# Patient Record
Sex: MALE | Race: WHITE | Employment: OTHER | URBAN - METROPOLITAN AREA
[De-identification: names, ages, dates, MRNs, and addresses within clinical notes are randomized per-mention and may not be internally consistent; named-entity substitution may affect disease eponyms.]

---

## 2017-03-07 ENCOUNTER — TELEPHONE (OUTPATIENT)
Dept: INTERNAL MEDICINE | Facility: CLINIC | Age: 72
End: 2017-03-07

## 2017-03-07 NOTE — TELEPHONE ENCOUNTER
PA request from Malgorzata in  for Jojo Morton Pen 5x3  Malgorzata: [phone]    Plan: [phone]     i.d: [id number]    Copy of PA in folder    Pao might know what else is covered, since we've had these requests before

## 2017-03-08 NOTE — TELEPHONE ENCOUNTER
Basaglar is covered alternative for Lantus. Sent to Angeles to review if okay to change prescription.

## 2017-03-09 NOTE — TELEPHONE ENCOUNTER
[facility] Lantus [facility] or Basaglar.    Gave V.O. Ok to switch to Basaglar, same dose.

## 2017-03-13 ENCOUNTER — OFFICE VISIT (OUTPATIENT)
Dept: INTERNAL MEDICINE | Facility: CLINIC | Age: 72
End: 2017-03-13
Payer: COMMERCIAL

## 2017-03-13 VITALS
WEIGHT: 199.1 LBS | HEIGHT: 72 IN | TEMPERATURE: 98.1 F | SYSTOLIC BLOOD PRESSURE: 128 MMHG | HEART RATE: 79 BPM | DIASTOLIC BLOOD PRESSURE: 60 MMHG | RESPIRATION RATE: 16 BRPM | OXYGEN SATURATION: 97 % | BODY MASS INDEX: 26.97 KG/M2

## 2017-03-13 DIAGNOSIS — E03.8 OTHER SPECIFIED HYPOTHYROIDISM: ICD-10-CM

## 2017-03-13 DIAGNOSIS — I10 ESSENTIAL HYPERTENSION: Primary | ICD-10-CM

## 2017-03-13 DIAGNOSIS — E08.42 DIABETIC POLYNEUROPATHY ASSOCIATED WITH DIABETES MELLITUS DUE TO UNDERLYING CONDITION (H): ICD-10-CM

## 2017-03-13 LAB
ALT SERPL W P-5'-P-CCNC: 16 U/L (ref 0–70)
ANION GAP SERPL CALCULATED.3IONS-SCNC: 9 MMOL/L (ref 3–14)
BUN SERPL-MCNC: 41 MG/DL (ref 7–30)
CALCIUM SERPL-MCNC: 8 MG/DL (ref 8.5–10.1)
CHLORIDE SERPL-SCNC: 112 MMOL/L (ref 94–109)
CHOLEST SERPL-MCNC: 148 MG/DL
CO2 SERPL-SCNC: 22 MMOL/L (ref 20–32)
CREAT SERPL-MCNC: 2.57 MG/DL (ref 0.66–1.25)
ERYTHROCYTE [DISTWIDTH] IN BLOOD BY AUTOMATED COUNT: 13.4 % (ref 10–15)
GFR SERPL CREATININE-BSD FRML MDRD: 25 ML/MIN/1.7M2
GLUCOSE SERPL-MCNC: 110 MG/DL (ref 70–99)
HBA1C MFR BLD: 7.2 % (ref 4.3–6)
HCT VFR BLD AUTO: 34.3 % (ref 40–53)
HDLC SERPL-MCNC: 42 MG/DL
HGB BLD-MCNC: 10.9 G/DL (ref 13.3–17.7)
LDLC SERPL CALC-MCNC: 80 MG/DL
MCH RBC QN AUTO: 28.5 PG (ref 26.5–33)
MCHC RBC AUTO-ENTMCNC: 31.8 G/DL (ref 31.5–36.5)
MCV RBC AUTO: 90 FL (ref 78–100)
NONHDLC SERPL-MCNC: 106 MG/DL
PLATELET # BLD AUTO: 252 10E9/L (ref 150–450)
POTASSIUM SERPL-SCNC: 4.8 MMOL/L (ref 3.4–5.3)
RBC # BLD AUTO: 3.83 10E12/L (ref 4.4–5.9)
SODIUM SERPL-SCNC: 143 MMOL/L (ref 133–144)
TRIGL SERPL-MCNC: 130 MG/DL
TSH SERPL DL<=0.05 MIU/L-ACNC: 2.9 MU/L (ref 0.4–4)
WBC # BLD AUTO: 9 10E9/L (ref 4–11)

## 2017-03-13 PROCEDURE — 36415 COLL VENOUS BLD VENIPUNCTURE: CPT | Performed by: NURSE PRACTITIONER

## 2017-03-13 PROCEDURE — 84460 ALANINE AMINO (ALT) (SGPT): CPT | Performed by: NURSE PRACTITIONER

## 2017-03-13 PROCEDURE — 84443 ASSAY THYROID STIM HORMONE: CPT | Mod: GZ | Performed by: NURSE PRACTITIONER

## 2017-03-13 PROCEDURE — 83036 HEMOGLOBIN GLYCOSYLATED A1C: CPT | Mod: GZ | Performed by: NURSE PRACTITIONER

## 2017-03-13 PROCEDURE — 99214 OFFICE O/P EST MOD 30 MIN: CPT | Performed by: NURSE PRACTITIONER

## 2017-03-13 PROCEDURE — 80048 BASIC METABOLIC PNL TOTAL CA: CPT | Performed by: NURSE PRACTITIONER

## 2017-03-13 PROCEDURE — 82043 UR ALBUMIN QUANTITATIVE: CPT | Mod: GZ | Performed by: NURSE PRACTITIONER

## 2017-03-13 PROCEDURE — 85027 COMPLETE CBC AUTOMATED: CPT | Performed by: NURSE PRACTITIONER

## 2017-03-13 PROCEDURE — 80061 LIPID PANEL: CPT | Performed by: NURSE PRACTITIONER

## 2017-03-13 NOTE — NURSING NOTE
Chief Complaint   Patient presents with     Follow Up For     diabetes       Initial /74 (BP Location: Right arm, Patient Position: Chair, Cuff Size: Adult Regular)  Pulse 79  Temp 98.1  F (36.7  C) (Oral)  Resp 16  Ht 6' (1.829 m)  Wt 199 lb 1.6 oz (90.3 kg)  SpO2 97%  BMI 27 kg/m2 Estimated body mass index is 27 kg/(m^2) as calculated from the following:    Height as of this encounter: 6' (1.829 m).    Weight as of this encounter: 199 lb 1.6 oz (90.3 kg).  Medication Reconciliation: complete

## 2017-03-13 NOTE — MR AVS SNAPSHOT
"              After Visit Summary   3/13/2017    Brady Oviedo    MRN: [id number]           Patient Information     Date Of Birth          1945        Visit Information        Provider Department      3/13/2017 9:00 AM Angeles Friedman NP [facility]        Today's Diagnoses     Essential hypertension    -  1    Diabetic polyneuropathy associated with diabetes mellitus due to underlying condition (H)        Other specified hypothyroidism          Care Instructions    6 month follow up  Angeles Friedman CNP          Follow-ups after your visit        Who to contact     If you have questions or need follow up information about today's clinic visit or your schedule please contact [facility] directly at [phone].  Normal or non-critical lab and imaging results will be communicated to you by MyChart, letter or phone within 4 business days after the clinic has received the results. If you do not hear from us within 7 days, please contact the clinic through MyChart or phone. If you have a critical or abnormal lab result, we will notify you by phone as soon as possible.  Submit refill requests through [employer] or call your pharmacy and they will forward the refill request to us. Please allow 3 business days for your refill to be completed.          Additional Information About Your Visit        MyChart Information     [employer] lets you send messages to your doctor, view your test results, renew your prescriptions, schedule appointments and more. To sign up, go to [email or web address] . Click on \"Log in\" on the left side of the screen, which will take you to the Welcome page. Then click on \"Sign up Now\" on the right side of the page.     You will be asked to enter the access code listed below, as well as some personal information. Please follow the directions to create your username and password.     Your access code is: [email or web address]  Expires: 6/11/2017  9:22 AM     Your " access code will  in 90 days. If you need help or a new code, please call your [facility] or [phone].        Care EveryWhere ID     This is your Care EveryWhere ID. This could be used by other organizations to access your [place] medical records  [id number]        Your Vitals Were     Pulse Temperature Respirations Height Pulse Oximetry BMI (Body Mass Index)    79 98.1  F (36.7  C) (Oral) 16 6' (1.829 m) 97% 27 kg/m2       Blood Pressure from Last 3 Encounters:   17 164/74   16 152/72   11/10/15 138/76    Weight from Last 3 Encounters:   17 199 lb 1.6 oz (90.3 kg)   16 195 lb 4.8 oz (88.6 kg)   11/10/15 200 lb 3.2 oz (90.8 kg)              We Performed the Following     Albumin Random Urine Quantitative     ALT     Basic metabolic panel     CBC with platelets     Hemoglobin A1c     Lipid Profile     TSH        Primary Care Provider Office Phone # Fax #    Angeles Friedman [phone]       [facility] [address] [facility] [facility] [address]        Thank you!     Thank you for choosing [facility]  for your care. Our goal is always to provide you with excellent care. Hearing back from our patients is one way we can continue to improve our services. Please take a few minutes to complete the written survey that you may receive in the mail after your visit with us. Thank you!             Your Updated Medication List - Protect others around you: Learn how to safely use, store and throw away your medicines at www.disposemymeds.org.          This list is accurate as of: 3/13/17  9:22 AM.  Always use your most recent med list.                   Brand Name Dispense Instructions for use    amLODIPine 10 MG tablet    NORVASC    90 tablet    Take 1 tablet (10 mg) by mouth daily 1 day at pm       aspirin 81 MG chewable tablet     90 tablet    Take 1 tablet (81 mg) by mouth daily       blood glucose calibration solution     1 each    Use  to calibrate blood glucose monitor as directed.       blood glucose monitoring lancets     3 Box    Use to test blood sugar 3-4 times daily or as directed.       blood glucose monitoring meter device kit     1 kit    Use to test blood sugars 3 times daily or as directed.       blood glucose monitoring test strip    ACCU-CHEK COMPACT DRUM    100 each    Use to test blood sugars 3 times daily or as directed.       chlorthalidone 25 MG tablet    HYGROTON    90 tablet    Take 1 tablet (25 mg) by mouth daily       cloNIDine 0.1 MG tablet    CATAPRES    90 tablet    Take 1 tablet (0.1 mg) by mouth daily       ferrous sulfate 325 (65 FE) MG tablet    IRON    30 tablet    Take 1 tablet (325 mg) by mouth daily (with breakfast)       * insulin pen needle 31G X 8 MM    B-D U/F    100 each    Use 1 pen needles daily or as directed.  No more refills after this.  Due for appt in April 2016.       * insulin pen needle 31G X 6 MM     100 each    Daily insulin injections       LANTUS SOLOSTAR 100 UNIT/ML injection   Generic drug:  insulin glargine     3 Month    15 units at bedtime       levothyroxine 50 MCG tablet    SYNTHROID/LEVOTHROID    90 tablet    Take 1 tablet (50 mcg) by mouth daily       metoprolol 100 MG 24 hr tablet    TOPROL-XL    90 tablet    Take 1 tablet (100 mg) by mouth daily       simvastatin 10 MG tablet    ZOCOR    90 tablet    Take 1 tablet (10 mg) by mouth At Bedtime Take 1 tablet by mouth At Bedtime.       * Notice:  This list has 2 medication(s) that are the same as other medications prescribed for you. Read the directions carefully, and ask your doctor or other care provider to review them with you.

## 2017-03-13 NOTE — PROGRESS NOTES
SUBJECTIVE:                                                    Brady Oviedo is a 71 year old male who presents to clinic today for the following health issues:      Diabetes Follow-up    Patient is checking blood sugars: once daily.  Results are as follows:         am -     Diabetic concerns: None     Symptoms of hypoglycemia (low blood sugar): none     Paresthesias (numbness or burning in feet) or sores: Yes numb     Date of last diabetic eye exam: 1 yr ago       Amount of exercise or physical activity: 2-3 days/week for an average of 15-30 minutes    Problems taking medications regularly: No    Medication side effects: none  Diet: diabetic    Hyperlipidemia Follow-Up      Rate your low fat/cholesterol diet?: good    Taking statin?  Yes, no muscle aches from statin    Other lipid medications/supplements?:  none     Hypertension Follow-up      Outpatient blood pressures are not being checked.    Low Salt Diet: no added salt     Hypothyroidism Follow-up      Since last visit, patient describes the following symptoms: Weight stable, no hair loss, no skin changes, no constipation, no loose stools       Problem list and histories reviewed & adjusted, as indicated.  Additional history: as documented    Patient Active Problem List   Diagnosis     Type II diabetes mellitus with peripheral circulatory disorder (HCC)     HTN (hypertension)     Diabetic polyneuropathy (HCC)     Routine health maintenance     Diabetic foot ulcer (H)     Hyperlipidemia with target LDL less than 100     Hypothyroidism     Sick sinus syndrome (H)     Health Care Home     Atrial fibrillation (H)     Complete AV block (H)     Chronic kidney disease, stage III (moderate)     Amputated toe, unspecified laterality (H)     Past Surgical History   Procedure Laterality Date     Amputate toe(s)  11/24/2013     Procedure: AMPUTATE TOE(S);  2ND RAY AMPUTATION OF LT FOOT ;  Surgeon: Clint Alvarez DPM;  Location: [facility] OR     Incision and  drainage bone lower extremity, combined  11/26/2013     Procedure: COMBINED INCISION AND DRAINAGE BONE LOWER EXTREMITY;  REVISIONAL LEFT FOOT INCISION AND DRAINAGE WITH BONE DEBRIDEMENT AND FLAP CLOSURE ;  Surgeon: Evonne Allison DPM, Pod;  Location: [facility] OR     Internal pacemaker  3/25/14       Social History   Substance Use Topics     Smoking status: Never Smoker     Smokeless tobacco: Never Used     Alcohol use Yes      Comment: rare     Family History   Problem Relation Age of Onset     Cancer - colorectal Mother      in remission prior to her death. Was killed by being hit by a car at 82     DIABETES Brother          Current Outpatient Prescriptions   Medication Sig Dispense Refill     simvastatin (ZOCOR) 10 MG tablet Take 1 tablet (10 mg) by mouth At Bedtime Take 1 tablet by mouth At Bedtime. 90 tablet 1     insulin pen needle (B-D U/F) 31G X 8 MM Use 1 pen needles daily or as directed.  No more refills after this.  Due for appt in April 2016. 100 each 0     metoprolol (TOPROL-XL) 100 MG 24 hr tablet Take 1 tablet (100 mg) by mouth daily 90 tablet 3     levothyroxine (SYNTHROID, LEVOTHROID) 50 MCG tablet Take 1 tablet (50 mcg) by mouth daily 90 tablet 3     blood glucose monitoring (ACCU-CHEK COMPACT DRUM) test strip Use to test blood sugars 3 times daily or as directed. 100 each 6     chlorthalidone (HYGROTON) 25 MG tablet Take 1 tablet (25 mg) by mouth daily 90 tablet 3     cloNIDine (CATAPRES) 0.1 MG tablet Take 1 tablet (0.1 mg) by mouth daily 90 tablet 3     amLODIPine (NORVASC) 10 MG tablet Take 1 tablet (10 mg) by mouth daily 1 day at pm 90 tablet 3     LANTUS SOLOSTAR 100 UNIT/ML soln 15 units at bedtime 3 Month 3     blood glucose monitoring (ACCU-CHEK MULTICLIX) lancets Use to test blood sugar 3-4 times daily or as directed. 3 Box 3     insulin pen needle 31G X 6 MM Daily insulin injections 100 each 0     ferrous sulfate (IRON) 325 (65 FE) MG tablet Take 1 tablet (325 mg) by mouth daily (with  breakfast) 30 tablet 2     Blood Glucose Monitoring Suppl (ACCU-CHEK COMPACT CARE KIT) KIT Use to test blood sugars 3 times daily or as directed. 1 kit 0     Blood Glucose Calibration (ACCU-CHEK COMPACT BLUE CONTROL) LIQD Use to calibrate blood glucose monitor as directed. 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 1 Device 0     Recent Labs   Lab Test  06/28/16   0816  10/08/15   0828  07/14/15   0854   06/11/15   0820   09/10/14   0832   A1C  8.8*  6.9*   --    --   6.2*   < >  6.6*   LDL  63  74  86   --    --    --   56   HDL  39*  43  32*   --    --    --   51   TRIG  126  74  117   --    --    --   114   ALT  18   --   16   --    --    --   12   CR  2.02*  1.86*  1.76*   < >  1.85*   < >  1.54*   GFRESTIMATED  33*  36*  38*   < >  36*   < >  45*   GFRESTBLACK  39*  44*  46*   < >  44*   < >  54*   POTASSIUM  4.3  4.6  4.8   < >  6.2*   < >  4.7   TSH  3.79  3.68   --    --    --    --   2.97    < > = values in this interval not displayed.      BP Readings from Last 3 Encounters:   03/13/17 128/60   06/28/16 152/72   11/10/15 138/76    Wt Readings from Last 3 Encounters:   03/13/17 199 lb 1.6 oz (90.3 kg)   06/28/16 195 lb 4.8 oz (88.6 kg)   11/10/15 200 lb 3.2 oz (90.8 kg)                    Reviewed and updated as needed this visit by clinical staff  Tobacco  Allergies  Meds  Med Hx  Surg Hx  Fam Hx  Soc Hx      Reviewed and updated as needed this visit by Provider         ROS:  C: NEGATIVE for fever, chills, change in weight  E/M: NEGATIVE for ear, mouth and throat problems  R: NEGATIVE for significant cough or SOB  CV: NEGATIVE for chest pain, palpitations or peripheral edema  GI: NEGATIVE for nausea, abdominal pain, heartburn, or change in bowel habits    OBJECTIVE:                                                     /60 (BP Location: Right arm, Patient Position: Chair, Cuff Size: Adult Large)  Pulse 79  Temp 98.1  F (36.7  C) (Oral)  Resp 16  Ht 6' (1.829 m)  Wt 199 lb 1.6 oz (90.3 kg)  SpO2 97%  BMI 27 kg/m2  Body mass index is 27 kg/(m^2).  GENERAL: healthy, alert and no distress  NECK: no adenopathy, no asymmetry, masses, or scars and thyroid normal to palpation  RESP: lungs clear to auscultation - no rales, rhonchi or wheezes  CV: regular rate and rhythm, normal S1 S2, no S3 or S4, no murmur, click or rub, no peripheral edema and peripheral pulses strong  Diabetic foot exam: normal DP and PT pulses, no trophic changes or ulcerative lesions and normal sensory exam         ASSESSMENT/PLAN:                                                              ICD-10-CM    1. Essential hypertension I10 Basic metabolic panel   2. Diabetic polyneuropathy associated with diabetes mellitus due to underlying condition (H) E08.42 CBC with platelets     Lipid Profile     Hemoglobin A1c     Albumin Random Urine Quantitative   3. Other specified hypothyroidism E03.8 ALT     TSH       Patient Instructions   6 month follow up  Angeles Friedman, NP  [facility]  3

## 2017-03-14 ENCOUNTER — TELEPHONE (OUTPATIENT)
Dept: INTERNAL MEDICINE | Facility: CLINIC | Age: 72
End: 2017-03-14

## 2017-03-14 DIAGNOSIS — N18.30 CHRONIC KIDNEY DISEASE, STAGE III (MODERATE) (H): Primary | ICD-10-CM

## 2017-03-14 LAB
CREAT UR-MCNC: 75 MG/DL
MICROALBUMIN UR-MCNC: 3310 MG/L
MICROALBUMIN/CREAT UR: 4437 MG/G CR (ref 0–17)

## 2017-03-14 NOTE — TELEPHONE ENCOUNTER
Please advise pt A1C improved to 7.2, HGB low at 10.9, advise daily ferrous sulfate 325 mg with stool softener as needed.  Kidney function is slightly worse would recommend seeing a nephrologist, referral done.  Angeles Friedman CNP

## 2017-03-14 NOTE — LETTER
[facility]  [address], [address]  [place]  [address]        March 14, 2017    Brady BRITTNY Oviedo    [address] [facility] [id number]              Dear Brady,    Your A1C improved to 7.2.     Your hemoglobin is low at 10.9, please begin taking ferrous sulfate 325 mg daily with a stool softener as needed (ferrous sulfate can cause constipation). Both the ferrous sulfate and a stool softener can be purchased over-the-counter.    Kidney function is slightly worse, I would recommend seeing a nephrologist. Referral done and a copy is enclosed for you. Please contact them to make an appointment.     If you have any questions or concerns, please contact our office at [phone].    Sincerely,      Angeles Friedman C.N.P.

## 2017-03-14 NOTE — TELEPHONE ENCOUNTER
Per demographics, pt requests all results be mailed to him, he does not want to be called with them.     Results mailed to pt with copy of referral.

## 2017-05-08 DIAGNOSIS — E11.51 TYPE II DIABETES MELLITUS WITH PERIPHERAL CIRCULATORY DISORDER (H): Primary | ICD-10-CM

## 2017-05-08 RX ORDER — INSULIN GLARGINE 100 [IU]/ML
25 INJECTION, SOLUTION SUBCUTANEOUS AT BEDTIME
Qty: 30 ML | Refills: 3 | Status: SHIPPED | OUTPATIENT
Start: 2017-05-08 | End: 2018-06-23

## 2017-05-08 NOTE — TELEPHONE ENCOUNTER
Pt calling.  He is almost out of Basaglar insulin.  States PCP recently increased his dose from 15 units QHS to 25 units QHS.    Please send new rx to pharm.    Last OV 3-13-17    Lab Results   Component Value Date    A1C 7.2 03/13/2017    A1C 8.8 06/28/2016    A1C 6.9 10/08/2015    A1C 6.2 06/11/2015    A1C 6.7 03/11/2015       Please advise, thanks.    (Rx pended.)    (See TE 3-7-17--per insur, pt had to switch from Lantus to Basaglar.)

## 2017-06-13 ENCOUNTER — OFFICE VISIT (OUTPATIENT)
Dept: CARDIOLOGY | Facility: CLINIC | Age: 72
End: 2017-06-13
Payer: COMMERCIAL

## 2017-06-13 ENCOUNTER — ALLIED HEALTH/NURSE VISIT (OUTPATIENT)
Dept: CARDIOLOGY | Facility: CLINIC | Age: 72
End: 2017-06-13
Payer: COMMERCIAL

## 2017-06-13 VITALS
BODY MASS INDEX: 25.87 KG/M2 | HEART RATE: 72 BPM | HEIGHT: 72 IN | SYSTOLIC BLOOD PRESSURE: 140 MMHG | WEIGHT: 191 LBS | DIASTOLIC BLOOD PRESSURE: 80 MMHG

## 2017-06-13 DIAGNOSIS — Z95.0 CARDIAC PACEMAKER IN SITU: Primary | ICD-10-CM

## 2017-06-13 DIAGNOSIS — I10 ESSENTIAL HYPERTENSION: Primary | ICD-10-CM

## 2017-06-13 DIAGNOSIS — I48.0 PAROXYSMAL ATRIAL FIBRILLATION (H): ICD-10-CM

## 2017-06-13 DIAGNOSIS — I44.1 AV BLOCK, MOBITZ 2: ICD-10-CM

## 2017-06-13 PROCEDURE — 93280 PM DEVICE PROGR EVAL DUAL: CPT | Performed by: INTERNAL MEDICINE

## 2017-06-13 PROCEDURE — 93000 ELECTROCARDIOGRAM COMPLETE: CPT | Performed by: INTERNAL MEDICINE

## 2017-06-13 PROCEDURE — 99214 OFFICE O/P EST MOD 30 MIN: CPT | Mod: 25 | Performed by: INTERNAL MEDICINE

## 2017-06-13 NOTE — MR AVS SNAPSHOT
After Visit Summary   6/13/2017    Brady Oviedo    MRN: [id number]           Patient Information     Date Of Birth          1945        Visit Information        Provider Department      6/13/2017 10:30 AM [place] DCRN [facility]        Today's Diagnoses     Cardiac pacemaker in situ    -  1       Follow-ups after your visit        Your next 10 appointments already scheduled     Sep 19, 2017  2:15 PM CDT   Remote PPM Check with [place] TECH1   [facility] ([facility])    [address] [place], [place] [address]  [facility] [id number] [phone]           This appointment is for a remote check of your pacemaker.  This is not an appointment at the office.              Future tests that were ordered for you today     Open Future Orders        Priority Expected Expires Ordered    Follow-Up with Cardiac Advanced Practice Provider Routine 6/13/2019 10/26/2019 6/13/2017            Who to contact     If you have questions or need follow up information about today's clinic visit or your schedule please contact [facility] directly at [phone].  Normal or non-critical lab and imaging results will be communicated to you by [employer], letter or phone within 4 business days after the clinic has received the results. If you do not hear from us within 7 days, please contact the clinic through [employer] or phone. If you have a critical or abnormal lab result, we will notify you by phone as soon as possible.  Submit refill requests through [employer] or call your pharmacy and they will forward the refill request to us. Please allow 3 business days for your refill to be completed.          Additional Information About Your Visit        MyChart Information     [employer] lets you send messages to your doctor, view your test results, renew your prescriptions, schedule appointments and more. To sign up, go  "to [email or web address] . Click on \"Log in\" on the left side of the screen, which will take you to the Welcome page. Then click on \"Sign up Now\" on the right side of the page.     You will be asked to enter the access code listed below, as well as some personal information. Please follow the directions to create your username and password.     Your access code is: [email or web address]  Expires: 2017 10:31 AM     Your access code will  in 90 days. If you need help or a new code, please call your [place] clinic or [phone].        Care EveryWhere ID     This is your Care EveryWhere ID. This could be used by other organizations to access your [place] medical records  [id number]         Blood Pressure from Last 3 Encounters:   17 140/80   17 128/60   16 152/72    Weight from Last 3 Encounters:   17 86.6 kg (191 lb)   17 90.3 kg (199 lb 1.6 oz)   16 88.6 kg (195 lb 4.8 oz)              We Performed the Following     PM DEVICE PROGRAMMING EVAL, DUAL LEAD PACER ([id number])        Primary Care Provider Office Phone # Fax #    Angeles Friedman [phone]       [facility] [address] [place] [address]        Thank you!     Thank you for choosing [facility]  for your care. Our goal is always to provide you with excellent care. Hearing back from our patients is one way we can continue to improve our services. Please take a few minutes to complete the written survey that you may receive in the mail after your visit with us. Thank you!             Your Updated Medication List - Protect others around you: Learn how to safely use, store and throw away your medicines at www.disposemymeds.org.          This list is accurate as of: 17 11:13 AM.  Always use your most recent med list.                   Brand Name Dispense Instructions for use    amLODIPine 10 MG tablet    NORVASC    90 tablet    Take " 1 tablet (10 mg) by mouth daily 1 day at pm       aspirin 81 MG chewable tablet     90 tablet    Take 1 tablet (81 mg) by mouth daily       blood glucose calibration solution     1 each    Use to calibrate blood glucose monitor as directed.       blood glucose monitoring lancets     3 Box    Use to test blood sugar 3-4 times daily or as directed.       blood glucose monitoring meter device kit     1 kit    Use to test blood sugars 3 times daily or as directed.       blood glucose monitoring test strip    ACCU-CHEK COMPACT DRUM    100 each    Use to test blood sugars 3 times daily or as directed.       chlorthalidone 25 MG tablet    HYGROTON    90 tablet    Take 1 tablet (25 mg) by mouth daily       cloNIDine 0.1 MG tablet    CATAPRES    90 tablet    Take 1 tablet (0.1 mg) by mouth daily       ferrous sulfate 325 (65 FE) MG tablet    IRON    30 tablet    Take 1 tablet (325 mg) by mouth daily (with breakfast)       * insulin pen needle 31G X 8 MM    B-D U/F    100 each    Use 1 pen needles daily or as directed.  No more refills after this.  Due for appt in April 2016.       * insulin pen needle 31G X 6 MM     100 each    Daily insulin injections       * LANTUS SOLOSTAR 100 UNIT/ML injection   Generic drug:  insulin glargine     3 Month    15 units at bedtime       * insulin glargine 100 UNIT/ML injection     30 mL    Inject 25 Units Subcutaneous At Bedtime       levothyroxine 50 MCG tablet    SYNTHROID/LEVOTHROID    90 tablet    Take 1 tablet (50 mcg) by mouth daily       metoprolol 100 MG 24 hr tablet    TOPROL-XL    90 tablet    Take 1 tablet (100 mg) by mouth daily       simvastatin 10 MG tablet    ZOCOR    90 tablet    Take 1 tablet (10 mg) by mouth At Bedtime Take 1 tablet by mouth At Bedtime.       * Notice:  This list has 4 medication(s) that are the same as other medications prescribed for you. Read the directions carefully, and ask your doctor or other care provider to review them with you.

## 2017-06-13 NOTE — PROGRESS NOTES
HPI and Plan:   See dictation    Orders Placed This Encounter   Procedures     Follow-Up with Cardiac Advanced Practice Provider     EKG 12-lead complete w/read - Clinics (performed today)       No orders of the defined types were placed in this encounter.      There are no discontinued medications.      Encounter Diagnoses   Name Primary?     Essential hypertension Yes     Paroxysmal atrial fibrillation (H)        CURRENT MEDICATIONS:  Current Outpatient Prescriptions   Medication Sig Dispense Refill     insulin glargine (BASAGLAR KWIKPEN) 100 UNIT/ML injection Inject 25 Units Subcutaneous At Bedtime 30 mL 3     simvastatin (ZOCOR) 10 MG tablet Take 1 tablet (10 mg) by mouth At Bedtime Take 1 tablet by mouth At Bedtime. 90 tablet 1     insulin pen needle (B-D U/F) 31G X 8 MM Use 1 pen needles daily or as directed.  No more refills after this.  Due for appt in April 2016. 100 each 0     metoprolol (TOPROL-XL) 100 MG 24 hr tablet Take 1 tablet (100 mg) by mouth daily 90 tablet 3     levothyroxine (SYNTHROID, LEVOTHROID) 50 MCG tablet Take 1 tablet (50 mcg) by mouth daily 90 tablet 3     blood glucose monitoring (ACCU-CHEK COMPACT DRUM) test strip Use to test blood sugars 3 times daily or as directed. 100 each 6     chlorthalidone (HYGROTON) 25 MG tablet Take 1 tablet (25 mg) by mouth daily 90 tablet 3     cloNIDine (CATAPRES) 0.1 MG tablet Take 1 tablet (0.1 mg) by mouth daily 90 tablet 3     amLODIPine (NORVASC) 10 MG tablet Take 1 tablet (10 mg) by mouth daily 1 day at pm 90 tablet 3     LANTUS SOLOSTAR 100 UNIT/ML soln 15 units at bedtime 3 Month 3     blood glucose monitoring (ACCU-CHEK MULTICLIX) lancets Use to test blood sugar 3-4 times daily or as directed. 3 Box 3     insulin pen needle 31G X 6 MM Daily insulin injections 100 each 0     ferrous sulfate (IRON) 325 (65 FE) MG tablet Take 1 tablet (325 mg) by mouth daily (with breakfast) 30 tablet 2     Blood Glucose Monitoring Suppl (ACCU-CHEK COMPACT CARE KIT)  KIT Use to test blood sugars 3 times daily or as directed. 1 kit 0     Blood Glucose Calibration (ACCU-CHEK COMPACT BLUE CONTROL) LIQD Use to calibrate blood glucose monitor as directed. 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)      Diabetic neuropathy (H)      HTN (hypertension)      Hyperlipidemia LDL goal < 130        PAST SURGICAL HISTORY:  Past Surgical History:   Procedure Laterality Date     AMPUTATE TOE(S)  11/24/2013    Procedure: AMPUTATE TOE(S);  2ND RAY AMPUTATION OF LT FOOT ;  Surgeon: Clint Alvarez DPM;  Location: [facility] OR     INCISION AND DRAINAGE BONE LOWER EXTREMITY, COMBINED  11/26/2013    Procedure: COMBINED INCISION AND DRAINAGE BONE LOWER EXTREMITY;  REVISIONAL LEFT FOOT INCISION AND DRAINAGE WITH BONE DEBRIDEMENT AND FLAP CLOSURE ;  Surgeon: Evonne Allison DPM, Pod;  Location: [facility] OR     internal pacemaker  3/25/14       FAMILY HISTORY:  Family History   Problem Relation Age of Onset     Cancer - colorectal Mother      in remission prior to her death. Was killed by being hit by a car at 82     Myocardial Infarction Father      DIABETES Brother      Unknown/Adopted Maternal Grandmother      Unknown/Adopted Maternal Grandfather      Unknown/Adopted Paternal Grandmother      Unknown/Adopted Paternal Grandfather      Family History Negative Sister      Family History Negative Son      Family History Negative Daughter      Family History Negative Brother      Family History Negative Brother        SOCIAL HISTORY:  Social History     Social History     Marital status:       Spouse name: N/A     Number of children: N/A     Years of education: N/A     Social History Main Topics     Smoking status: Never Smoker     Smokeless tobacco: Never Used     Alcohol use Yes      Comment: rare     Drug use: No     Sexual activity: Yes     Partners: Female     Other Topics Concern     Special Diet No     Exercise Yes     3 x weekly yard work & dog walking      Social History Narrative       Review of Systems:  Skin:  Positive for scaling     Eyes:  Positive for glasses    ENT:  Negative      Respiratory:  Negative       Cardiovascular:    Positive for;lightheadedness possibly dehydrating  Gastroenterology: Negative      Genitourinary:  Negative      Musculoskeletal:  Negative      Neurologic:  Positive for numbness or tingling of feet diabetic neuropathy   Psychiatric:  Negative      Heme/Lymph/Imm:  Negative      Endocrine:  Positive for diabetes;thyroid disorder      [id number]      CC  Angeles Friedman NP  [facility]  [address] [place] [address]

## 2017-06-13 NOTE — LETTER
6/13/2017    Angeles Friedman NP  [address] [place] [address]    RE: Brady Oviedo       Dear Colleague,    I had the pleasure of seeing Brady Oviedo in the [facility].    I had the pleasure of seeing Mr. Brady Oviedo in follow-up of AV block with previous pacemaker implantation (03/2014) and paroxysmal atrial fibrillation.  He also has hypertension, dyslipidemia, hypothyroidism and type 2 diabetes mellitus.      In coming in Mr. Oviedo stated that he felt well.  He is not particularly active.  He does not have hint of chest pain or discomfort that could suggest angina.  He has multiple risk factors for vascular disease.      In the past, we have detected paroxysmal atrial fibrillation through his device, but the episodes have been brief, up to several minutes.  In any event, the patient has been adamant he will not take anticoagulation.      PHYSICAL EXAMINATION:   VITAL SIGNS:  Blood pressure 140/80, pulse 72 and regular, weight 86 kg, height 183 cm.   NECK:  Supple, without carotid bruits.   LUNGS:  Clear without crackles or wheezes.   CARDIOVASCULAR:  Regular rhythm without murmur or rub.  Nicely healed left pectoral incision.   EXTREMITIES:  No edema.   SKIN:  No rashes.      DIAGNOSTIC STUDIES:  His 12-lead EKG today showed sinus rhythm with a first degree AV block and left bundle branch block.     Outpatient Encounter Prescriptions as of 6/13/2017   Medication Sig Dispense Refill     insulin glargine (BASAGLAR KWIKPEN) 100 UNIT/ML injection Inject 25 Units Subcutaneous At Bedtime 30 mL 3     insulin pen needle (B-D U/F) 31G X 8 MM Use 1 pen needles daily or as directed.  No more refills after this.  Due for appt in April 2016. 100 each 0     metoprolol (TOPROL-XL) 100 MG 24 hr tablet Take 1 tablet (100 mg) by mouth daily 90 tablet 3     blood glucose monitoring (ACCU-CHEK COMPACT DRUM) test strip Use to test blood sugars 3 times daily or as  directed. 100 each 6     amLODIPine (NORVASC) 10 MG tablet Take 1 tablet (10 mg) by mouth daily 1 day at pm 90 tablet 3     LANTUS SOLOSTAR 100 UNIT/ML soln 15 units at bedtime 3 Month 3     blood glucose monitoring (ACCU-CHEK MULTICLIX) lancets Use to test blood sugar 3-4 times daily or as directed. 3 Box 3     insulin pen needle 31G X 6 MM Daily insulin injections 100 each 0     [DISCONTINUED] simvastatin (ZOCOR) 10 MG tablet Take 1 tablet (10 mg) by mouth At Bedtime Take 1 tablet by mouth At Bedtime. 90 tablet 1     [DISCONTINUED] levothyroxine (SYNTHROID, LEVOTHROID) 50 MCG tablet Take 1 tablet (50 mcg) by mouth daily 90 tablet 3     [DISCONTINUED] chlorthalidone (HYGROTON) 25 MG tablet Take 1 tablet (25 mg) by mouth daily 90 tablet 3     [DISCONTINUED] cloNIDine (CATAPRES) 0.1 MG tablet Take 1 tablet (0.1 mg) by mouth daily 90 tablet 3     ferrous sulfate (IRON) 325 (65 FE) MG tablet Take 1 tablet (325 mg) by mouth daily (with breakfast) 30 tablet 2     Blood Glucose Monitoring Suppl (ACCU-CHEK COMPACT CARE KIT) KIT Use to test blood sugars 3 times daily or as directed. 1 kit 0     Blood Glucose Calibration (ACCU-CHEK COMPACT BLUE CONTROL) LIQD Use to calibrate blood glucose monitor as directed. 1 each 0     aspirin 81 MG chewable tablet Take 1 tablet (81 mg) by mouth daily 90 tablet 4     Facility-Administered Encounter Medications as of 6/13/2017   Medication Dose Route Frequency Provider Last Rate Last Dose     No abx ordered pre-op    PRN Iram Lr, APRN CRNA   1 each at 11/26/13 1205      IMPRESSION:   1.  History of intermittent complete AV block and syncope.  His pacemaker has not been interrogated in over a year and I urged Mr. Oviedo to be more compliant with checks in the Device Clinic.  He does have a Merlin device at home and is willing to use it as needed.  He understands that it is important to keep an eye on his device more often than once per year.   2.  Paroxysmal atrial fibrillation.   I do not have a reading of his pacemaker since 04/2016.  Previously, the patient had episodes of atrial fibrillation lasting up to 15 minutes, thus anticoagulation was not deemed necessary.  Today, Mr. Oviedo reiterated that even if he had more atrial fibrillation he would not want to take a blood thinner because he is concerned about his risk of bleeding.  He appeared adamant about this.  He will stay on a baby aspirin.   3.  Hypertension.  His blood pressure was elevated upon arrival in the clinic, but improved after a 20 minute rest.  He was encouraged to keep an eye on his blood pressure at home and keep in close contact with his primary care physician for this matter.      RECOMMENDATIONS:    Routine follow-up in the Device Clinic.  I have requested a followup with a provider in the EP Clinic in 2 years.      It was my pleasure seeing him today.     Sincerely,    Ruchi Jaime MD     [facility]

## 2017-06-13 NOTE — MR AVS SNAPSHOT
"              After Visit Summary   6/13/2017    Brady Oviedo    MRN: [id number]           Patient Information     Date Of Birth          1945        Visit Information        Provider Department      6/13/2017 9:45 AM Ruchi Jaime MD [facility]        Today's Diagnoses     Essential hypertension    -  1    Paroxysmal atrial fibrillation (H)        AV block, Mobitz 2           Follow-ups after your visit        Additional Services     Follow-Up with Cardiac Advanced Practice Provider                 Future tests that were ordered for you today     Open Future Orders        Priority Expected Expires Ordered    Follow-Up with Cardiac Advanced Practice Provider Routine 6/13/2019 10/26/2019 6/13/2017            Who to contact     If you have questions or need follow up information about today's clinic visit or your schedule please contact [facility] directly at [phone].  Normal or non-critical lab and imaging results will be communicated to you by MyChart, letter or phone within 4 business days after the clinic has received the results. If you do not hear from us within 7 days, please contact the clinic through MyChart or phone. If you have a critical or abnormal lab result, we will notify you by phone as soon as possible.  Submit refill requests through [employer] or call your pharmacy and they will forward the refill request to us. Please allow 3 business days for your refill to be completed.          Additional Information About Your Visit        MyChart Information     [employer] lets you send messages to your doctor, view your test results, renew your prescriptions, schedule appointments and more. To sign up, go to [email or web address] . Click on \"Log in\" on the left side of the screen, which will take you to the Welcome page. Then click on \"Sign up Now\" on the right side of the page.     You will be " asked to enter the access code listed below, as well as some personal information. Please follow the directions to create your username and password.     Your access code is: [email or web address]  Expires: 2017 10:31 AM     Your access code will  in 90 days. If you need help or a new code, please call your [place] clinic or [phone].        Care EveryWhere ID     This is your Care EveryWhere ID. This could be used by other organizations to access your [place] medical records  [id number]        Your Vitals Were     Pulse Height BMI (Body Mass Index)             72 1.829 m (6') 25.9 kg/m2          Blood Pressure from Last 3 Encounters:   17 140/80   17 128/60   16 152/72    Weight from Last 3 Encounters:   17 86.6 kg (191 lb)   17 90.3 kg (199 lb 1.6 oz)   16 88.6 kg (195 lb 4.8 oz)              We Performed the Following     EKG 12-lead complete w/read - Clinics (performed today)        Primary Care Provider Office Phone # Fax #    Angeles Friedman [phone]       [facility] [address] [place] [address]        Thank you!     Thank you for choosing [facility] [place]  for your care. Our goal is always to provide you with excellent care. Hearing back from our patients is one way we can continue to improve our services. Please take a few minutes to complete the written survey that you may receive in the mail after your visit with us. Thank you!             Your Updated Medication List - Protect others around you: Learn how to safely use, store and throw away your medicines at www.disposemymeds.org.          This list is accurate as of: 17 10:31 AM.  Always use your most recent med list.                   Brand Name Dispense Instructions for use    amLODIPine 10 MG tablet    NORVASC    90 tablet    Take 1 tablet (10 mg) by mouth daily 1 day at pm       aspirin 81 MG chewable tablet     90  tablet    Take 1 tablet (81 mg) by mouth daily       blood glucose calibration solution     1 each    Use to calibrate blood glucose monitor as directed.       blood glucose monitoring lancets     3 Box    Use to test blood sugar 3-4 times daily or as directed.       blood glucose monitoring meter device kit     1 kit    Use to test blood sugars 3 times daily or as directed.       blood glucose monitoring test strip    ACCU-CHEK COMPACT DRUM    100 each    Use to test blood sugars 3 times daily or as directed.       chlorthalidone 25 MG tablet    HYGROTON    90 tablet    Take 1 tablet (25 mg) by mouth daily       cloNIDine 0.1 MG tablet    CATAPRES    90 tablet    Take 1 tablet (0.1 mg) by mouth daily       ferrous sulfate 325 (65 FE) MG tablet    IRON    30 tablet    Take 1 tablet (325 mg) by mouth daily (with breakfast)       * insulin pen needle 31G X 8 MM    B-D U/F    100 each    Use 1 pen needles daily or as directed.  No more refills after this.  Due for appt in April 2016.       * insulin pen needle 31G X 6 MM     100 each    Daily insulin injections       * LANTUS SOLOSTAR 100 UNIT/ML injection   Generic drug:  insulin glargine     3 Month    15 units at bedtime       * insulin glargine 100 UNIT/ML injection     30 mL    Inject 25 Units Subcutaneous At Bedtime       levothyroxine 50 MCG tablet    SYNTHROID/LEVOTHROID    90 tablet    Take 1 tablet (50 mcg) by mouth daily       metoprolol 100 MG 24 hr tablet    TOPROL-XL    90 tablet    Take 1 tablet (100 mg) by mouth daily       simvastatin 10 MG tablet    ZOCOR    90 tablet    Take 1 tablet (10 mg) by mouth At Bedtime Take 1 tablet by mouth At Bedtime.       * Notice:  This list has 4 medication(s) that are the same as other medications prescribed for you. Read the directions carefully, and ask your doctor or other care provider to review them with you.

## 2017-06-13 NOTE — PROGRESS NOTES
[facility] [employer] Pacemaker Device Check  Patient has not had his device check since 9/30/2015  AP: 1.3 % : <1 %  Mode: DDD  bpm        Underlying Rhythm: NSR  Heart Rate: Heart rate stable with good variability.  Sensing: WNL    Pacing Threshold: WNL   Impedance: WNL  Battery Status: Approximately 11.9-13.2 years longevity.  Atrial Arrhythmia: 580 mode switches comprising of <1% of the time, EGMs showed PAF with a controlled VR, longest lasting 20 minutes. Patient refuses AC  Ventricular Arrhythmia: 4 Ventricular High Rates logged, 1 EGM showed 9 beat run of NSVT, rate 160-190 bpm. The other 3 was RVR response to PAF rate 175 bpm.  Setting Change: 0    Care Plan: Follow up with Q 3 month remote checks. Reminded patient to keep Merlin on. FADY Wells RN

## 2017-06-13 NOTE — PROGRESS NOTES
HISTORY OF PRESENT ILLNESS:    I had the pleasure of seeing Mr. Brady Oviedo in follow-up of AV block with previous pacemaker implantation (03/2014) and paroxysmal atrial fibrillation.  He also has hypertension, dyslipidemia, hypothyroidism and type 2 diabetes mellitus.      In coming in Mr. Oviedo stated that he felt well.  He is not particularly active.  He does not have hint of chest pain or discomfort that could suggest angina.  He has multiple risk factors for vascular disease.      In the past, we have detected paroxysmal atrial fibrillation through his device, but the episodes have been brief, up to several minutes.  In any event, the patient has been adamant he will not take anticoagulation.      PHYSICAL EXAMINATION:   VITAL SIGNS:  Blood pressure 140/80, pulse 72 and regular, weight 86 kg, height 183 cm.   NECK:  Supple, without carotid bruits.   LUNGS:  Clear without crackles or wheezes.   CARDIOVASCULAR:  Regular rhythm without murmur or rub.  Nicely healed left pectoral incision.   EXTREMITIES:  No edema.   SKIN:  No rashes.      DIAGNOSTIC STUDIES:  His 12-lead EKG today showed sinus rhythm with a first degree AV block and left bundle branch block.      IMPRESSION:   1.  History of intermittent complete AV block and syncope.  His pacemaker has not been interrogated in over a year and I urged Mr. Oviedo to be more compliant with checks in the Device Clinic.  He does have a Merlin device at home and is willing to use it as needed.  He understands that it is important to keep an eye on his device more often than once per year.   2.  Paroxysmal atrial fibrillation.  I do not have a reading of his pacemaker since 04/2016.  Previously, the patient had episodes of atrial fibrillation lasting up to 15 minutes, thus anticoagulation was not deemed necessary.  Today, Mr. Oviedo reiterated that even if he had more atrial fibrillation he would not want to take a blood thinner because he is concerned about  his risk of bleeding.  He appeared adamant about this.  He will stay on a baby aspirin.   3.  Hypertension.  His blood pressure was elevated upon arrival in the clinic, but improved after a 20 minute rest.  He was encouraged to keep an eye on his blood pressure at home and keep in close contact with his primary care physician for this matter.      RECOMMENDATIONS:    Routine follow-up in the Device Clinic.  I have requested a followup with a provider in the EP Clinic in 2 years.      It was my pleasure seeing him today.        FREDRICK STARK MD, FACC           cc:   Angeles Friedman NP   [facility] [address] [place] [address]         D: 2017 10:30   T: 2017 14:19   MT: ANALI      Name:     RAHEL BOYCE   MRN:      [id number]        Account:      [id number]   :      1945           Service Date: 2017      Document: [id number]

## 2017-06-20 DIAGNOSIS — E78.5 HYPERLIPIDEMIA WITH TARGET LDL LESS THAN 100: ICD-10-CM

## 2017-06-20 DIAGNOSIS — E03.8 OTHER SPECIFIED HYPOTHYROIDISM: ICD-10-CM

## 2017-06-20 DIAGNOSIS — I10 BENIGN ESSENTIAL HYPERTENSION: ICD-10-CM

## 2017-06-20 NOTE — TELEPHONE ENCOUNTER
Clonidine AND Chlorthalidibe      Last Written Prescription Date: 06/28/16 BOTH  Last Fill Quantity: 90  BOTH3, # refills: 3 BOTH  Last Office Visit with [facility], [facility] or [facility] prescribing provider: 03/13/17       Potassium   Date Value Ref Range Status   03/13/2017 4.8 3.4 - 5.3 mmol/L Final     Creatinine   Date Value Ref Range Status   03/13/2017 2.57 (H) 0.66 - 1.25 mg/dL Final     BP Readings from Last 3 Encounters:   06/13/17 140/80   03/13/17 128/60   06/28/16 152/72     Levothyroxine     Last Written Prescription Date: 06/28/16  Last Quantity: 90, # refills: 3  Last Office Visit with [facility] or [facility] prescribing provider: 03/13/17        TSH   Date Value Ref Range Status   03/13/2017 2.90 0.40 - 4.00 mU/L Final     Simvastatin     Last Written Prescription Date: 06/28/16  Last Fill Quantity: 90, # refills: 1  Last Office Visit with [facility] or [facility] prescribing provider: 03/13/17       Lab Results   Component Value Date    CHOL 148 03/13/2017     Lab Results   Component Value Date    HDL 42 03/13/2017     Lab Results   Component Value Date    LDL 80 03/13/2017     Lab Results   Component Value Date    TRIG 130 03/13/2017     Lab Results   Component Value Date    CHOLHDLRATIO 3.1 10/08/2015       Labs showing if normal/abnormal  Lab Results   Component Value Date    CHOL 148 03/13/2017    TRIG 130 03/13/2017    HDL 42 03/13/2017    LDL 80 03/13/2017    VLDL 15 10/08/2015    CHOLHDLRATIO 3.1 10/08/2015

## 2017-06-21 RX ORDER — CHLORTHALIDONE 25 MG/1
TABLET ORAL
Qty: 90 TABLET | Refills: 0 | Status: SHIPPED | OUTPATIENT
Start: 2017-06-21 | End: 2017-09-14

## 2017-06-21 RX ORDER — CLONIDINE HYDROCHLORIDE 0.1 MG/1
TABLET ORAL
Qty: 90 TABLET | Refills: 0 | Status: SHIPPED | OUTPATIENT
Start: 2017-06-21 | End: 2017-09-14

## 2017-06-21 RX ORDER — SIMVASTATIN 10 MG
TABLET ORAL
Qty: 90 TABLET | Refills: 0 | Status: SHIPPED | OUTPATIENT
Start: 2017-06-21

## 2017-06-21 RX ORDER — LEVOTHYROXINE SODIUM 50 UG/1
TABLET ORAL
Qty: 90 TABLET | Refills: 0 | Status: SHIPPED | OUTPATIENT
Start: 2017-06-21 | End: 2017-09-14

## 2017-09-14 DIAGNOSIS — Z13.6 CARDIOVASCULAR SCREENING; LDL GOAL LESS THAN 100: ICD-10-CM

## 2017-09-14 DIAGNOSIS — E03.8 OTHER SPECIFIED HYPOTHYROIDISM: ICD-10-CM

## 2017-09-14 DIAGNOSIS — E08.42 DIABETIC POLYNEUROPATHY ASSOCIATED WITH DIABETES MELLITUS DUE TO UNDERLYING CONDITION (H): ICD-10-CM

## 2017-09-14 DIAGNOSIS — I10 BENIGN ESSENTIAL HYPERTENSION: ICD-10-CM

## 2017-09-14 RX ORDER — SIMVASTATIN 10 MG
TABLET ORAL
Qty: 90 TABLET | Refills: 0 | Status: SHIPPED | OUTPATIENT
Start: 2017-09-14 | End: 2017-12-09

## 2017-09-14 RX ORDER — CLONIDINE HYDROCHLORIDE 0.1 MG/1
TABLET ORAL
Qty: 90 TABLET | Refills: 0 | Status: SHIPPED | OUTPATIENT
Start: 2017-09-14 | End: 2017-12-09

## 2017-09-14 RX ORDER — AMLODIPINE BESYLATE 10 MG/1
TABLET ORAL
Qty: 90 TABLET | Refills: 0 | Status: SHIPPED | OUTPATIENT
Start: 2017-09-14 | End: 2017-12-09

## 2017-09-14 RX ORDER — LEVOTHYROXINE SODIUM 50 UG/1
TABLET ORAL
Qty: 90 TABLET | Refills: 1 | Status: SHIPPED | OUTPATIENT
Start: 2017-09-14 | End: 2018-06-23

## 2017-09-14 RX ORDER — METOPROLOL SUCCINATE 100 MG/1
TABLET, EXTENDED RELEASE ORAL
Qty: 90 TABLET | Refills: 1 | Status: SHIPPED | OUTPATIENT
Start: 2017-09-14 | End: 2018-06-23

## 2017-09-14 RX ORDER — AMLODIPINE BESYLATE 10 MG/1
TABLET ORAL
Qty: 90 TABLET | Refills: 0 | OUTPATIENT
Start: 2017-09-14

## 2017-09-14 RX ORDER — CHLORTHALIDONE 25 MG/1
TABLET ORAL
Qty: 90 TABLET | Refills: 0 | Status: SHIPPED | OUTPATIENT
Start: 2017-09-14 | End: 2017-12-09

## 2017-09-14 NOTE — TELEPHONE ENCOUNTER
Pantoprazole      Last Written Prescription Date:   Last Fill Quantity: ,  # refills:    Last Office Visit with [facility], [facility] or [facility] prescribing provider: 3/13/17- not on med list

## 2017-09-14 NOTE — TELEPHONE ENCOUNTER
Metoprolol 100 mg      Last Written Prescription Date: 6/28/16  Last Fill Quantity: 90, # refills: 3    Last Office Visit with [facility], [facility] or [facility] [employer] prescribing provider:  3/13/17   Future Office Visit:        BP Readings from Last 3 Encounters:   06/13/17 140/80   03/13/17 128/60   06/28/16 152/72     Levothyroxine 50 mg     Last Written Prescription Date: 6/21/17  Last Quantity: 90, # refills: 0  Last Office Visit with [facility] [place], [facility] or [facility] prescribing provider: 3/13/17        TSH   Date Value Ref Range Status   03/13/2017 2.90 0.40 - 4.00 mU/L Final     Insulin Pen Needle       Last Written Prescription Date: 6/28/16  Last Fill Quantity: 100,  # refills: 0   Last Office Visit with [facility], [facility] or [employer] prescribing provider: 3/13/17    Clonidine 0.1 mg      Last Written Prescription Date: 6/21/17  Last Fill Quantity: 90, # refills: 0  Last Office Visit with [facility] [place], [facility] or [facility] prescribing provider: 3/13/17       Potassium   Date Value Ref Range Status   03/13/2017 4.8 3.4 - 5.3 mmol/L Final     Creatinine   Date Value Ref Range Status   03/13/2017 2.57 (H) 0.66 - 1.25 mg/dL Final     BP Readings from Last 3 Encounters:   06/13/17 140/80   03/13/17 128/60   06/28/16 152/72     Hygroton       Last Written Prescription Date: 6/21/17  Last Fill Quantity: 90, # refills: 0  Last [facility] Visit [facility] prescribing provider: 3/13/17       Potassium   Date Value Ref Range Status   03/13/2017 4.8 3.4 - 5.3 mmol/L Final     Creatinine   Date Value Ref Range Status   03/13/2017 2.57 (H) 0.66 - 1.25 mg/dL Final     BP Readings from Last 3 Encounters:   06/13/17 140/80   03/13/17 128/60   06/28/16 152/72     Blood Glucose Monitoring      Last Written Prescription Date: 6/28/16  Last Fill Quantity: 100,  # refills: 6   Last Office Visit with [facility] [place], [facility] or [facility] prescribing provider: 3/13/17                                               Amlodipine 10 mg       Last Written Prescription Date: 6/28/16  Last Fill  Quantity: 90, # refills: 3    Last Office Visit with [facility], [facility] or  [employer] prescribing provider:  3/13/17     Simvastatin 10 mg     Last Written Prescription Date: 6/21/17  Last Fill Quantity: 90, # refills: 0  Last Office Visit with [facility], [facility] or  [employer] prescribing provider: 3/13/17       Lab Results   Component Value Date    CHOL 148 03/13/2017     Lab Results   Component Value Date    HDL 42 03/13/2017     Lab Results   Component Value Date    LDL 80 03/13/2017     Lab Results   Component Value Date    TRIG 130 03/13/2017     Lab Results   Component Value Date    CHOLHDLRATIO 3.1 10/08/2015       Labs showing if normal/abnormal  Lab Results   Component Value Date    CHOL 148 03/13/2017    TRIG 130 03/13/2017    HDL 42 03/13/2017    LDL 80 03/13/2017    VLDL 15 10/08/2015    CHOLHDLRATIO 3.1 10/08/2015       Future Office Visit:        BP Readings from Last 3 Encounters:   06/13/17 140/80   03/13/17 128/60   06/28/16 152/72

## 2017-09-19 ENCOUNTER — ALLIED HEALTH/NURSE VISIT (OUTPATIENT)
Dept: CARDIOLOGY | Facility: CLINIC | Age: 72
End: 2017-09-19
Payer: COMMERCIAL

## 2017-09-19 DIAGNOSIS — Z95.0 CARDIAC PACEMAKER IN SITU: Primary | ICD-10-CM

## 2017-09-19 PROCEDURE — 93296 REM INTERROG EVL PM/IDS: CPT | Performed by: INTERNAL MEDICINE

## 2017-09-19 PROCEDURE — 93294 REM INTERROG EVL PM/LDLS PM: CPT | Performed by: INTERNAL MEDICINE

## 2017-09-19 NOTE — MR AVS SNAPSHOT
"              After Visit Summary   9/19/2017    Brady Oviedo    MRN: [id number]           Patient Information     Date Of Birth          1945        Visit Information        Provider Department      9/19/2017 2:15 PM AUSTIN TECH1 [facility]        Today's Diagnoses     Cardiac pacemaker in situ    -  1       Follow-ups after your visit        Your next 10 appointments already scheduled     Sep 19, 2017  2:15 PM CDT   Remote PPM Check with AUSTIN TECH1   [facility] ([facility])    [address] [place] [id number] [phone]           This appointment is for a remote check of your pacemaker.  This is not an appointment at the office.              Who to contact     If you have questions or need follow up information about today's clinic visit or your schedule please contact [facility] directly at [phone].  Normal or non-critical lab and imaging results will be communicated to you by [employer], letter or phone within 4 business days after the clinic has received the results. If you do not hear from us within 7 days, please contact the clinic through [employer] or phone. If you have a critical or abnormal lab result, we will notify you by phone as soon as possible.  Submit refill requests through [employer] or call your pharmacy and they will forward the refill request to us. Please allow 3 business days for your refill to be completed.          Additional Information About Your Visit        MyChart Information     [employer] lets you send messages to your doctor, view your test results, renew your prescriptions, schedule appointments and more. To sign up, go to [email or web address] . Click on \"Log in\" on the left side of the screen, which will take you to the Welcome page. Then click on \"Sign up Now\" on the right side of the page.     You will be asked to enter " the access code listed below, as well as some personal information. Please follow the directions to create your username and password.     Your access code is: [email or web address]  Expires: 2017 10:10 AM     Your access code will  in 90 days. If you need help or a new code, please call your [place] clinic or [phone].        Care EveryWhere ID     This is your Care EveryWhere ID. This could be used by other organizations to access your [place] medical records  [id number]         Blood Pressure from Last 3 Encounters:   17 140/80   17 128/60   16 152/72    Weight from Last 3 Encounters:   17 86.6 kg (191 lb)   17 90.3 kg (199 lb 1.6 oz)   16 88.6 kg (195 lb 4.8 oz)              We Performed the Following     INTERROGATION DEVICE EVAL REMOTE, PACER/ICD (15659)     PM DEVICE INTERROGATE REMOTE (01934)        Primary Care Provider Office Phone # Fax #    Angeles Friedman, WILLIAM [phone]       [address] [id number]        Equal Access to Services     PETROS NAJERA : Hadii aad ku hadasho Soomaali, waaxda luqadaha, qaybta kaalmada adeegyada, america scottn aviva boss. So [facility] [phone].    ATENCIÓN: Si habla español, tiene a summers disposición servicios gratuitos de asistencia lingüística. Llame al [phone].    We comply with applicable federal civil rights laws and [place] laws. We do not discriminate on the basis of race, color, national origin, age, disability sex, sexual orientation or gender identity.            Thank you!     Thank you for choosing [facility]  for your care. Our goal is always to provide you with excellent care. Hearing back from our patients is one way we can continue to improve our services. Please take a few minutes to complete the written survey that you may receive in the mail after your visit with us. Thank you!             Your Updated Medication  List - Protect others around you: Learn how to safely use, store and throw away your medicines at www.disposemymeds.org.          This list is accurate as of: 9/19/17 10:10 AM.  Always use your most recent med list.                   Brand Name Dispense Instructions for use Diagnosis    amLODIPine 10 MG tablet    NORVASC    90 tablet    TAKE 1 TABLET BY MOUTH EVERY EVENING    Benign essential hypertension       aspirin 81 MG chewable tablet     90 tablet    Take 1 tablet (81 mg) by mouth daily    Atrial fibrillation (H)       blood glucose calibration solution     1 each    Use to calibrate blood glucose monitor as directed.    Type 2 diabetes, HbA1C goal < 8% (H)       blood glucose monitoring lancets     3 Box    Use to test blood sugar 3-4 times daily or as directed.    Diabetic polyneuropathy associated with diabetes mellitus due to underlying condition (H)       blood glucose monitoring meter device kit     1 kit    Use to test blood sugars 3 times daily or as directed.    Type 2 diabetes, HbA1C goal < 8% (H)       blood glucose monitoring test strip    ACCU-CHEK COMPACT DRUM    100 each    Use to test blood sugars 3 times daily or as directed.    Diabetic polyneuropathy associated with diabetes mellitus due to underlying condition (H)       chlorthalidone 25 MG tablet    HYGROTON    90 tablet    TAKE 1 TABLET BY MOUTH DAILY    Benign essential hypertension       cloNIDine 0.1 MG tablet    CATAPRES    90 tablet    TAKE 1 TABLET BY MOUTH DAILY    Benign essential hypertension       ferrous sulfate 325 (65 FE) MG tablet    IRON    30 tablet    Take 1 tablet (325 mg) by mouth daily (with breakfast)    Iron deficiency anemia       * insulin pen needle 31G X 6 MM     100 each    Daily insulin injections    Diabetic polyneuropathy associated with diabetes mellitus due to underlying condition (H)       * insulin pen needle 31G X 8 MM    B-D U/F    100 each    Use 1 pen needles daily or as directed.    Diabetic  polyneuropathy associated with diabetes mellitus due to underlying condition (H)       * LANTUS SOLOSTAR 100 UNIT/ML injection   Generic drug:  insulin glargine     3 Month    15 units at bedtime    Diabetic polyneuropathy associated with diabetes mellitus due to underlying condition (H)       * insulin glargine 100 UNIT/ML injection     30 mL    Inject 25 Units Subcutaneous At Bedtime    Type II diabetes mellitus with peripheral circulatory disorder (H)       levothyroxine 50 MCG tablet    SYNTHROID/LEVOTHROID    90 tablet    TAKE 1 TABLET BY MOUTH DAILY    Other specified hypothyroidism       metoprolol 100 MG 24 hr tablet    TOPROL-XL    90 tablet    TAKE 1 TABLET(100 MG) BY MOUTH DAILY    Benign essential hypertension       * simvastatin 10 MG tablet    ZOCOR    90 tablet    TAKE 1 TABLET(10 MG) BY MOUTH AT BEDTIME    Hyperlipidemia with target LDL less than 100       * simvastatin 10 MG tablet    ZOCOR    90 tablet    TAKE 1 TABLET(10 MG) BY MOUTH AT BEDTIME    CARDIOVASCULAR SCREENING; LDL GOAL LESS THAN 100       * Notice:  This list has 6 medication(s) that are the same as other medications prescribed for you. Read the directions carefully, and ask your doctor or other care provider to review them with you.

## 2017-09-19 NOTE — PROGRESS NOTES
[facility] [employer] (D) Remote PPM Device Check  AP: <1 % : <1 %  Mode: DDD        Presenting Rhythm: AS/VS, rate 80bpm  Heart Rate: Adequate rates per histogram  Sensing: Stable    Pacing Threshold: Stable    Impedance: Stable  Battery Status: 12-13.3 years  Atrial Arrhythmia: None  Ventricular Arrhythmia: None     Care Plan: F/u PPM Merlin q 3 months. Gave patient results over the phone. Merrick,CVT

## 2017-11-21 ENCOUNTER — TRANSFERRED RECORDS (OUTPATIENT)
Dept: HEALTH INFORMATION MANAGEMENT | Facility: CLINIC | Age: 72
End: 2017-11-21

## 2017-12-09 DIAGNOSIS — Z13.6 CARDIOVASCULAR SCREENING; LDL GOAL LESS THAN 100: ICD-10-CM

## 2017-12-09 DIAGNOSIS — I10 BENIGN ESSENTIAL HYPERTENSION: ICD-10-CM

## 2017-12-11 RX ORDER — SIMVASTATIN 10 MG
TABLET ORAL
Qty: 90 TABLET | Refills: 0 | Status: SHIPPED | OUTPATIENT
Start: 2017-12-11 | End: 2018-06-23

## 2017-12-11 RX ORDER — AMLODIPINE BESYLATE 10 MG/1
TABLET ORAL
Qty: 90 TABLET | Refills: 0 | Status: SHIPPED | OUTPATIENT
Start: 2017-12-11 | End: 2018-06-23

## 2017-12-11 RX ORDER — CLONIDINE HYDROCHLORIDE 0.1 MG/1
TABLET ORAL
Qty: 90 TABLET | Refills: 0 | Status: SHIPPED | OUTPATIENT
Start: 2017-12-11 | End: 2018-06-23

## 2017-12-11 RX ORDER — CHLORTHALIDONE 25 MG/1
TABLET ORAL
Qty: 90 TABLET | Refills: 0 | Status: SHIPPED | OUTPATIENT
Start: 2017-12-11 | End: 2018-06-23

## 2017-12-11 NOTE — TELEPHONE ENCOUNTER
Last OV: 03/13/17, per OV note: f/u 6 months.    Called pt, relayed he is overdue for appt. Pt was agreeable to schedule appt, but then got agitated and demanded knowing his co-pay. Discuss this staff member does not know his co-pay amount and requested he call his insurance company. Pt become more angry saying he won't come in for appt unless it's ensured he won't get a bill. Discuss that insurance will be the one to discuss this with, but will consult with clinic resources to see if any further information can be gathered regarding co-pay.    BP Readings from Last 3 Encounters:   06/13/17 140/80   03/13/17 128/60   06/28/16 152/72     Discussed with Jazmyn, pt representative who was able to see at this time according to our records there is no co-pay for his insurance. Unable to know what the final billing would be.     Called pt, relayed above information and gave consumer price line phone number to see if they have further information regarding pricing. Pt was frustrated and agitated speaking of billing stating he plans on going to different clinic. Gave clinic administrator's phone number as pt is frustrated.    Routing refill request to provider for review/approval because:  Labs out of range:  Cr (03/13/17)  See above note re: co-pay concerns and possibly not coming in for appt.      Please advise regarding refill, thanks.

## 2017-12-26 ENCOUNTER — ALLIED HEALTH/NURSE VISIT (OUTPATIENT)
Dept: CARDIOLOGY | Facility: CLINIC | Age: 72
End: 2017-12-26
Payer: COMMERCIAL

## 2017-12-26 DIAGNOSIS — Z95.0 CARDIAC PACEMAKER IN SITU: Primary | ICD-10-CM

## 2017-12-26 PROCEDURE — 93296 REM INTERROG EVL PM/IDS: CPT | Performed by: INTERNAL MEDICINE

## 2017-12-26 PROCEDURE — 93294 REM INTERROG EVL PM/LDLS PM: CPT | Performed by: INTERNAL MEDICINE

## 2017-12-26 NOTE — PROGRESS NOTES
[facility] [employer]  2110 (D) Remote PPM Device Check  AP: <1% : <1%  Mode: DDD        Presenting Rhythm: SR, vent rate 83bpm  Heart Rate: adequate heart rate per histogram  Sensing: stable    Pacing Threshold: stable    Impedance: stable  Battery Status: 11.6 - 13.1 years remaining  Atrial Arrhythmia: none  Ventricular Arrhythmia: 1 vent high rate. EGM shows Vs > As lasting 3 seconds, rate 170bpm increasing to 210bpm before it broke. EF 50% (2013). Reviewed findings with Alcides SELLERS.     Care Plan: F/U Merlin q 3 months. Mailed letter with results and next transmission date per pt request. Maddi MORTON

## 2017-12-26 NOTE — MR AVS SNAPSHOT
"              After Visit Summary   12/26/2017    Brady Oviedo    MRN: [id number]           Patient Information     Date Of Birth          1945        Visit Information        Provider Department      12/26/2017 3:15 PM GEOVANNA LOPEZ [facility]        Today's Diagnoses     Cardiac pacemaker in situ    -  1       Follow-ups after your visit        Your next 10 appointments already scheduled     Dec 26, 2017  3:15 PM CST   Remote PPM Check with AUSTIN TECH1   [facility] ([facility])    [address] [place], [place] [address]  [facility] [address] [phone]           This appointment is for a remote check of your pacemaker.  This is not an appointment at the office.              Who to contact     If you have questions or need follow up information about today's clinic visit or your schedule please contact [facility] directly at [phone].  Normal or non-critical lab and imaging results will be communicated to you by [employer], letter or phone within 4 business days after the clinic has received the results. If you do not hear from us within 7 days, please contact the clinic through [employer] or phone. If you have a critical or abnormal lab result, we will notify you by phone as soon as possible.  Submit refill requests through [employer] or call your pharmacy and they will forward the refill request to us. Please allow 3 business days for your refill to be completed.          Additional Information About Your Visit        MyChart Information     [employer] lets you send messages to your doctor, view your test results, renew your prescriptions, schedule appointments and more. To sign up, go to [email or web address] . Click on \"Log in\" on the left side of the screen, which will take you to the Welcome page. Then click on \"Sign up Now\" on the right side of the page.     You will be asked to enter the " access code listed below, as well as some personal information. Please follow the directions to create your username and password.     Your access code is: [email or web address]  Expires: 3/26/2018  9:10 AM     Your access code will  in 90 days. If you need help or a new code, please call your [place] clinic or [phone].        Care EveryWhere ID     This is your Care EveryWhere ID. This could be used by other organizations to access your [facility] medical records  [id number]         Blood Pressure from Last 3 Encounters:   17 140/80   17 128/60   16 152/72    Weight from Last 3 Encounters:   17 86.6 kg (191 lb)   17 90.3 kg (199 lb 1.6 oz)   16 88.6 kg (195 lb 4.8 oz)              We Performed the Following     INTERROGATION DEVICE EVAL REMOTE, PACER/ICD (97387)     PM DEVICE INTERROGATE REMOTE (70832)        Primary Care Provider Office Phone # Fax #    Angeles Friedman, WILLIAM [phone]       [address] [id number]        Equal Access to Services     PETROS NAJERA : Hadii aad ku hadasho Soomaali, waaxda luqadaha, qaybta kaalmada adeegyada, waxay danielein haytannan adeeg zoltan boss. So [facility] [phone].    ATENCIÓN: Si habla español, tiene a summers disposición servicios gratuitos de asistencia lingüística. Llame al [phone].    We comply with applicable federal civil rights laws and [place] laws. We do not discriminate on the basis of race, color, national origin, age, disability, sex, sexual orientation, or gender identity.            Thank you!     Thank you for choosing [facility]  for your care. Our goal is always to provide you with excellent care. Hearing back from our patients is one way we can continue to improve our services. Please take a few minutes to complete the written survey that you may receive in the mail after your visit with us. Thank you!             Your Updated Medication List -  Protect others around you: Learn how to safely use, store and throw away your medicines at www.disposemymeds.org.          This list is accurate as of: 12/26/17  9:10 AM.  Always use your most recent med list.                   Brand Name Dispense Instructions for use Diagnosis    amLODIPine 10 MG tablet    NORVASC    90 tablet    TAKE 1 TABLET BY MOUTH EVERY EVENING    Benign essential hypertension       aspirin 81 MG chewable tablet     90 tablet    Take 1 tablet (81 mg) by mouth daily    Atrial fibrillation (H)       blood glucose calibration solution     1 each    Use to calibrate blood glucose monitor as directed.    Type 2 diabetes, HbA1C goal < 8% (H)       blood glucose monitoring lancets     3 Box    Use to test blood sugar 3-4 times daily or as directed.    Diabetic polyneuropathy associated with diabetes mellitus due to underlying condition (H)       blood glucose monitoring meter device kit     1 kit    Use to test blood sugars 3 times daily or as directed.    Type 2 diabetes, HbA1C goal < 8% (H)       blood glucose monitoring test strip    ACCU-CHEK COMPACT DRUM    100 each    Use to test blood sugars 3 times daily or as directed.    Diabetic polyneuropathy associated with diabetes mellitus due to underlying condition (H)       chlorthalidone 25 MG tablet    HYGROTON    90 tablet    TAKE 1 TABLET BY MOUTH DAILY    Benign essential hypertension       cloNIDine 0.1 MG tablet    CATAPRES    90 tablet    TAKE 1 TABLET BY MOUTH DAILY    Benign essential hypertension       ferrous sulfate 325 (65 FE) MG tablet    IRON    30 tablet    Take 1 tablet (325 mg) by mouth daily (with breakfast)    Iron deficiency anemia       * insulin pen needle 31G X 6 MM     100 each    Daily insulin injections    Diabetic polyneuropathy associated with diabetes mellitus due to underlying condition (H)       * insulin pen needle 31G X 8 MM    B-D U/F    100 each    Use 1 pen needles daily or as directed.    Diabetic  polyneuropathy associated with diabetes mellitus due to underlying condition (H)       * LANTUS SOLOSTAR 100 UNIT/ML injection   Generic drug:  insulin glargine     3 Month    15 units at bedtime    Diabetic polyneuropathy associated with diabetes mellitus due to underlying condition (H)       * BASAGLAR 100 UNIT/ML injection     30 mL    Inject 25 Units Subcutaneous At Bedtime    Type II diabetes mellitus with peripheral circulatory disorder (H)       levothyroxine 50 MCG tablet    SYNTHROID/LEVOTHROID    90 tablet    TAKE 1 TABLET BY MOUTH DAILY    Other specified hypothyroidism       metoprolol 100 MG 24 hr tablet    TOPROL-XL    90 tablet    TAKE 1 TABLET(100 MG) BY MOUTH DAILY    Benign essential hypertension       * simvastatin 10 MG tablet    ZOCOR    90 tablet    TAKE 1 TABLET(10 MG) BY MOUTH AT BEDTIME    Hyperlipidemia with target LDL less than 100       * simvastatin 10 MG tablet    ZOCOR    90 tablet    TAKE 1 TABLET(10 MG) BY MOUTH AT BEDTIME    CARDIOVASCULAR SCREENING; LDL GOAL LESS THAN 100       * Notice:  This list has 6 medication(s) that are the same as other medications prescribed for you. Read the directions carefully, and ask your doctor or other care provider to review them with you.

## 2018-04-12 ENCOUNTER — DOCUMENTATION ONLY (OUTPATIENT)
Dept: CARDIOLOGY | Facility: CLINIC | Age: 73
End: 2018-04-12

## 2018-04-12 NOTE — PROGRESS NOTES
Patient missed Merlin transmission on 4/3/18. Sent letter 4/5, no response. Sent 1 week letter today

## 2018-06-23 DIAGNOSIS — E11.51 TYPE II DIABETES MELLITUS WITH PERIPHERAL CIRCULATORY DISORDER (H): ICD-10-CM

## 2018-06-23 DIAGNOSIS — I10 BENIGN ESSENTIAL HYPERTENSION: ICD-10-CM

## 2018-06-23 DIAGNOSIS — E03.8 OTHER SPECIFIED HYPOTHYROIDISM: ICD-10-CM

## 2018-06-23 DIAGNOSIS — Z13.6 CARDIOVASCULAR SCREENING; LDL GOAL LESS THAN 100: ICD-10-CM

## 2018-06-23 NOTE — LETTER
6/28/2018     Brady Oviedo  [address] [address] [address]      Dear Brady:        We will no longer be able to fill your medications unless you are seen. Your prescriptions were filled one more time, but this will be the last time until you are seen. Please call [phone] to get an appointment scheduled.       Thank you        Sailaja SELLERS with Angeles Friedman CNP

## 2018-06-26 NOTE — TELEPHONE ENCOUNTER
"Requested Prescriptions   Pending Prescriptions Disp Refills     amLODIPine (NORVASC) 10 MG tablet [Pharmacy Med Name: AMLODIPINE BESYLATE 10MG TABLETS]  Last Written Prescription Date:  12/11/2017  Last Fill Quantity: 90,  # refills: 0   Last office visit: 3/13/2017 with prescribing provider:     Future Office Visit:   90 tablet 0     Sig: TAKE 1 TABLET BY MOUTH EVERY EVENING    Calcium Channel Blockers Protocol  Failed    6/23/2018  2:27 PM       Failed - Blood pressure under 140/90 in past 12 months    BP Readings from Last 3 Encounters:   06/13/17 140/80   03/13/17 128/60   06/28/16 152/72                Failed - Recent (12 mo) or future (30 days) visit within the authorizing provider's specialty    Patient had office visit in the last 12 months or has a visit in the next 30 days with authorizing provider or within the authorizing provider's specialty.  See \"Patient Info\" tab in inbasket, or \"Choose Columns\" in Meds & Orders section of the refill encounter.           Failed - Normal serum creatinine on file in past 12 months    Recent Labs   Lab Test  03/13/17   0923   CR  2.57*            Passed - Patient is age 18 or older        chlorthalidone (HYGROTON) 25 MG tablet [Pharmacy Med Name: CHLORTHALIDONE 25MG TABLETS]  Last Written Prescription Date:  12/11/2017  Last Fill Quantity: 90,  # refills: 0   Last office visit: 3/13/2017 with prescribing provider:     Future Office Visit:   90 tablet 0     Sig: TAKE 1 TABLET BY MOUTH DAILY    Diuretics (Including Combos) Protocol Failed    6/23/2018  2:27 PM       Failed - Blood pressure under 140/90 in past 12 months    BP Readings from Last 3 Encounters:   06/13/17 140/80   03/13/17 128/60   06/28/16 152/72                Failed - Recent (12 mo) or future (30 days) visit within the authorizing provider's specialty    Patient had office visit in the last 12 months or has a visit in the next 30 days with authorizing provider or within the authorizing provider's " "specialty.  See \"Patient Info\" tab in inbasket, or \"Choose Columns\" in Meds & Orders section of the refill encounter.           Failed - Normal serum creatinine on file in past 12 months    Recent Labs   Lab Test  03/13/17   0923   CR  2.57*             Failed - Normal serum potassium on file in past 12 months    Recent Labs   Lab Test  03/13/17   0923   POTASSIUM  4.8                   Failed - Normal serum sodium on file in past 12 months    Recent Labs   Lab Test  03/13/17   0923   NA  143             Passed - Patient is age 18 or older        cloNIDine (CATAPRES) 0.1 MG tablet [Pharmacy Med Name: CLONIDINE 0.1MG TABLETS]  Last Written Prescription Date:  12/11/2017  Last Fill Quantity: 90,  # refills: 0   Last office visit: 3/13/2017 with prescribing provider:     Future Office Visit:   90 tablet 0     Sig: TAKE 1 TABLET BY MOUTH DAILY    Central Acting Antiadrenergic Agents Failed    6/23/2018  2:27 PM       Failed - Blood pressure under 140/90 in past 12 months    BP Readings from Last 3 Encounters:   06/13/17 140/80   03/13/17 128/60   06/28/16 152/72                Failed - Recent (12 mo) or future (30 days) visit within the authorizing provider's specialty    Patient had office visit in the last 12 months or has a visit in the next 30 days with authorizing provider or within the authorizing provider's specialty.  See \"Patient Info\" tab in inbasket, or \"Choose Columns\" in Meds & Orders section of the refill encounter.           Failed - Normal serum creatinine on file within past 12 months    Recent Labs   Lab Test  03/13/17   0923   CR  2.57*            Passed - Patient is 6 years of age or older        levothyroxine (SYNTHROID/LEVOTHROID) 50 MCG tablet [Pharmacy Med Name: LEVOTHYROXINE 0.05MG (50MCG) TAB]  Last Written Prescription Date:  9/14/2017  Last Fill Quantity: 90,  # refills: 1   Last office visit: 3/13/2017 with prescribing provider:     Future Office Visit:   90 tablet 0     Sig: TAKE 1 TABLET " "BY MOUTH DAILY    Thyroid Protocol Failed    6/23/2018  2:27 PM       Failed - Recent (12 mo) or future (30 days) visit within the authorizing provider's specialty    Patient had office visit in the last 12 months or has a visit in the next 30 days with authorizing provider or within the authorizing provider's specialty.  See \"Patient Info\" tab in inbasket, or \"Choose Columns\" in Meds & Orders section of the refill encounter.           Failed - Normal TSH on file in past 12 months    Recent Labs   Lab Test  03/13/17   0923   TSH  2.90             Passed - Patient is 12 years or older        metoprolol succinate (TOPROL-XL) 100 MG 24 hr tablet [Pharmacy Med Name: METOPROLOL ER SUCCINATE 100MG TABS]  Last Written Prescription Date:  9/14/2017  Last Fill Quantity: 90,  # refills: 1   Last office visit: 3/13/2017 with prescribing provider:     Future Office Visit:   90 tablet 0     Sig: TAKE 1 TABLET(100 MG) BY MOUTH DAILY    Beta-Blockers Protocol Failed    6/23/2018  2:27 PM       Failed - Blood pressure under 140/90 in past 12 months    BP Readings from Last 3 Encounters:   06/13/17 140/80   03/13/17 128/60   06/28/16 152/72                Failed - Recent (12 mo) or future (30 days) visit within the authorizing provider's specialty    Patient had office visit in the last 12 months or has a visit in the next 30 days with authorizing provider or within the authorizing provider's specialty.  See \"Patient Info\" tab in inbasket, or \"Choose Columns\" in Meds & Orders section of the refill encounter.           Passed - Patient is age 6 or older        simvastatin (ZOCOR) 10 MG tablet [Pharmacy Med Name: SIMVASTATIN 10MG TABLETS]  Last Written Prescription Date:  12/11/2017  Last Fill Quantity: 90,  # refills: 0  Last office visit: 3/13/2017 with prescribing provider:     Future Office Visit:   90 tablet 0     Sig: TAKE 1 TABLET(10 MG) BY MOUTH AT BEDTIME    Statins Protocol Failed    6/23/2018  2:27 PM       Failed - LDL on " "file in past 12 months    Recent Labs   Lab Test  03/13/17   0923   LDL  80            Failed - Recent (12 mo) or future (30 days) visit within the authorizing provider's specialty    Patient had office visit in the last 12 months or has a visit in the next 30 days with authorizing provider or within the authorizing provider's specialty.  See \"Patient Info\" tab in inbasket, or \"Choose Columns\" in Meds & Orders section of the refill encounter.           Passed - No abnormal creatine kinase in past 12 months    No lab results found.            Passed - Patient is age 18 or older        BASAGLAR 100 UNIT/ML injection [Pharmacy Med Name: BASAGLAR 100 U/ML KWIKPEN INJ 3ML]  Last Written Prescription Date:  5/8/2017  Last Fill Quantity: 30ml,  # refills: 3   Last office visit: 3/13/2017 with prescribing provider:     Future Office Visit:    0     Sig: INJECT 25 UNITS SUBCUTANEOUSLY AT BEDTIME    Long Acting Insulin Protocol Failed    6/23/2018  2:27 PM       Failed - Blood pressure less than 140/90 in past 6 months    BP Readings from Last 3 Encounters:   06/13/17 140/80   03/13/17 128/60   06/28/16 152/72                Failed - LDL on file in past 12 months    Recent Labs   Lab Test  03/13/17   0923   LDL  80            Failed - Microalbumin on file in past 12 months    Recent Labs   Lab Test  03/13/17   0929   MICROL  3310   UMALCR  4437.00*            Failed - Serum creatinine on file in past 12 months    Recent Labs   Lab Test  03/13/17   0923   CR  2.57*            Failed - HgbA1C in past 3 or 6 months    If HgbA1C is 8 or greater, it needs to be on file within the past 3 months.  If less than 8, must be on file within the past 6 months.     Recent Labs   Lab Test  03/13/17   0923   A1C  7.2*            Failed - Recent (6 mo) or future (30 days) visit within the authorizing provider's specialty    Patient had office visit in the last 6 months or has a visit in the next 30 days with authorizing provider or within the " "authorizing provider's specialty.  See \"Patient Info\" tab in inbasket, or \"Choose Columns\" in Meds & Orders section of the refill encounter.           Passed - Patient is age 18 or older        "

## 2018-06-28 ENCOUNTER — TELEPHONE (OUTPATIENT)
Dept: INTERNAL MEDICINE | Facility: CLINIC | Age: 73
End: 2018-06-28

## 2018-06-28 DIAGNOSIS — E08.42 DIABETIC POLYNEUROPATHY ASSOCIATED WITH DIABETES MELLITUS DUE TO UNDERLYING CONDITION (H): ICD-10-CM

## 2018-06-28 RX ORDER — CHLORTHALIDONE 25 MG/1
TABLET ORAL
Qty: 90 TABLET | Refills: 0 | Status: ON HOLD | OUTPATIENT
Start: 2018-06-28 | End: 2018-10-15

## 2018-06-28 RX ORDER — LEVOTHYROXINE SODIUM 50 UG/1
TABLET ORAL
Qty: 90 TABLET | Refills: 0 | Status: SHIPPED | OUTPATIENT
Start: 2018-06-28

## 2018-06-28 RX ORDER — INSULIN GLARGINE 100 [IU]/ML
INJECTION, SOLUTION SUBCUTANEOUS
Qty: 15 ML | Refills: 0 | Status: ON HOLD | OUTPATIENT
Start: 2018-06-28 | End: 2018-10-15

## 2018-06-28 RX ORDER — SIMVASTATIN 10 MG
TABLET ORAL
Qty: 90 TABLET | Refills: 0 | Status: SHIPPED | OUTPATIENT
Start: 2018-06-28 | End: 2018-10-12

## 2018-06-28 RX ORDER — AMLODIPINE BESYLATE 10 MG/1
TABLET ORAL
Qty: 90 TABLET | Refills: 0 | Status: SHIPPED | OUTPATIENT
Start: 2018-06-28 | End: 2018-10-12

## 2018-06-28 RX ORDER — CLONIDINE HYDROCHLORIDE 0.1 MG/1
TABLET ORAL
Qty: 90 TABLET | Refills: 0 | Status: SHIPPED | OUTPATIENT
Start: 2018-06-28

## 2018-06-28 RX ORDER — METOPROLOL SUCCINATE 100 MG/1
TABLET, EXTENDED RELEASE ORAL
Qty: 90 TABLET | Refills: 0 | Status: SHIPPED | OUTPATIENT
Start: 2018-06-28

## 2018-06-28 NOTE — TELEPHONE ENCOUNTER
Angeles-pt last seen on 3/13/17. Everytime pt is called to let him know he needs an appt he gets very, very angry and agitated. What do you want us to do?

## 2018-06-28 NOTE — TELEPHONE ENCOUNTER
Per [place]-Please send letter to pt, refills done, MUST f/u for any further meds.  Angeles Friedman CNP      Letter mailed, and I also called pt and relayed above

## 2018-06-29 RX ORDER — PEN NEEDLE, DIABETIC 31 GX5/16"
NEEDLE, DISPOSABLE MISCELLANEOUS
Qty: 30 EACH | Refills: 0 | Status: SHIPPED | OUTPATIENT
Start: 2018-06-29

## 2018-06-29 NOTE — TELEPHONE ENCOUNTER
Meme, calling from [facility] pharmacy, advised patient is overdue for Office visit. Medication is being filled for 1 time refill only due to:  Patient needs to be seen because it has been more than one year since last visit.

## 2018-06-29 NOTE — TELEPHONE ENCOUNTER
"Requested Prescriptions   Pending Prescriptions Disp Refills     B-D U/F 31G X 8 MM insulin pen needle [Pharmacy Med Name: B-D PEN NDL SHRT 74DF6AM(5/16) JUNIOR] 100 each 0    Last Written Prescription Date:  09/14/2017  Last Fill Quantity: 100,  # refills: 0   Last office visit: 3/13/2017 with prescribing provider:     Future Office Visit:   Sig: USE 1 NEEDLE EVERY DAY OR AS DIRECTED    Diabetic Supplies Protocol Failed    6/28/2018  6:23 PM       Failed - Recent (6 mo) or future (30 days) visit within the authorizing provider's specialty    Patient had office visit in the last 6 months or has a visit in the next 30 days with authorizing provider.  See \"Patient Info\" tab in inbasket, or \"Choose Columns\" in Meds & Orders section of the refill encounter.           Passed - Patient is 18 years of age or older        "

## 2018-10-12 ENCOUNTER — APPOINTMENT (OUTPATIENT)
Dept: ULTRASOUND IMAGING | Facility: CLINIC | Age: 73
DRG: 683 | End: 2018-10-12
Attending: HOSPITALIST
Payer: COMMERCIAL

## 2018-10-12 ENCOUNTER — HOSPITAL ENCOUNTER (INPATIENT)
Facility: CLINIC | Age: 73
LOS: 3 days | Discharge: HOME | DRG: 683 | End: 2018-10-15
Attending: EMERGENCY MEDICINE | Admitting: HOSPITALIST
Payer: COMMERCIAL

## 2018-10-12 DIAGNOSIS — N40.1 BENIGN PROSTATIC HYPERPLASIA WITH LOWER URINARY TRACT SYMPTOMS, SYMPTOM DETAILS UNSPECIFIED: Primary | ICD-10-CM

## 2018-10-12 DIAGNOSIS — N19 RENAL FAILURE, UNSPECIFIED CHRONICITY: ICD-10-CM

## 2018-10-12 DIAGNOSIS — E11.51 TYPE II DIABETES MELLITUS WITH PERIPHERAL CIRCULATORY DISORDER (H): ICD-10-CM

## 2018-10-12 DIAGNOSIS — N39.0 URINARY TRACT INFECTION WITHOUT HEMATURIA, SITE UNSPECIFIED: ICD-10-CM

## 2018-10-12 DIAGNOSIS — D64.9 ANEMIA, UNSPECIFIED TYPE: ICD-10-CM

## 2018-10-12 PROBLEM — N17.9 AKI (ACUTE KIDNEY INJURY) (H): Status: ACTIVE | Noted: 2018-10-12

## 2018-10-12 LAB
ALBUMIN SERPL-MCNC: 3 G/DL (ref 3.4–5)
ALBUMIN UR-MCNC: 100 MG/DL
ALP SERPL-CCNC: 96 U/L (ref 40–150)
ALT SERPL W P-5'-P-CCNC: 11 U/L (ref 0–70)
ANION GAP SERPL CALCULATED.3IONS-SCNC: 10 MMOL/L (ref 3–14)
APPEARANCE UR: ABNORMAL
AST SERPL W P-5'-P-CCNC: 6 U/L (ref 0–45)
BACTERIA #/AREA URNS HPF: ABNORMAL /HPF
BASOPHILS # BLD AUTO: 0 10E9/L (ref 0–0.2)
BASOPHILS NFR BLD AUTO: 0.2 %
BILIRUB DIRECT SERPL-MCNC: 0.1 MG/DL (ref 0–0.2)
BILIRUB SERPL-MCNC: 0.4 MG/DL (ref 0.2–1.3)
BILIRUB UR QL STRIP: NEGATIVE
BUN SERPL-MCNC: 69 MG/DL (ref 7–30)
BURR CELLS BLD QL SMEAR: SLIGHT
CALCIUM SERPL-MCNC: 7 MG/DL (ref 8.5–10.1)
CHLORIDE SERPL-SCNC: 111 MMOL/L (ref 94–109)
CO2 SERPL-SCNC: 20 MMOL/L (ref 20–32)
COLOR UR AUTO: YELLOW
CREAT SERPL-MCNC: 4.27 MG/DL (ref 0.66–1.25)
CREAT UR-MCNC: 75 MG/DL
DIFFERENTIAL METHOD BLD: ABNORMAL
EOSINOPHIL # BLD AUTO: 0.1 10E9/L (ref 0–0.7)
EOSINOPHIL NFR BLD AUTO: 1.8 %
ERYTHROCYTE [DISTWIDTH] IN BLOOD BY AUTOMATED COUNT: 13.5 % (ref 10–15)
FRACT EXCRET NA UR+SERPL-RTO: 2.8 %
GFR SERPL CREATININE-BSD FRML MDRD: 14 ML/MIN/1.7M2
GLUCOSE BLDC GLUCOMTR-MCNC: 115 MG/DL (ref 70–99)
GLUCOSE BLDC GLUCOMTR-MCNC: 164 MG/DL (ref 70–99)
GLUCOSE SERPL-MCNC: 176 MG/DL (ref 70–99)
GLUCOSE UR STRIP-MCNC: 150 MG/DL
HBA1C MFR BLD: 7 % (ref 0–5.6)
HCT VFR BLD AUTO: 28.2 % (ref 40–53)
HGB BLD-MCNC: 8.9 G/DL (ref 13.3–17.7)
HGB UR QL STRIP: ABNORMAL
IMM GRANULOCYTES # BLD: 0 10E9/L (ref 0–0.4)
IMM GRANULOCYTES NFR BLD: 0.5 %
KETONES UR STRIP-MCNC: NEGATIVE MG/DL
LEUKOCYTE ESTERASE UR QL STRIP: ABNORMAL
LYMPHOCYTES # BLD AUTO: 1.9 10E9/L (ref 0.8–5.3)
LYMPHOCYTES NFR BLD AUTO: 29.3 %
MCH RBC QN AUTO: 29 PG (ref 26.5–33)
MCHC RBC AUTO-ENTMCNC: 31.6 G/DL (ref 31.5–36.5)
MCV RBC AUTO: 92 FL (ref 78–100)
MONOCYTES # BLD AUTO: 0.6 10E9/L (ref 0–1.3)
MONOCYTES NFR BLD AUTO: 9.3 %
NEUTROPHILS # BLD AUTO: 4 10E9/L (ref 1.6–8.3)
NEUTROPHILS NFR BLD AUTO: 58.9 %
NITRATE UR QL: NEGATIVE
NRBC # BLD AUTO: 0 10*3/UL
NRBC BLD AUTO-RTO: 0 /100
PH UR STRIP: 5 PH (ref 5–7)
PHOSPHATE SERPL-MCNC: 5.4 MG/DL (ref 2.5–4.5)
PLATELET # BLD AUTO: 228 10E9/L (ref 150–450)
PLATELET # BLD EST: ABNORMAL 10*3/UL
POIKILOCYTOSIS BLD QL SMEAR: SLIGHT
POTASSIUM SERPL-SCNC: 4.9 MMOL/L (ref 3.4–5.3)
PROT SERPL-MCNC: 7 G/DL (ref 6.8–8.8)
PROT UR-MCNC: 3.83 G/L
PROT/CREAT 24H UR: 5.13 G/G CR (ref 0–0.2)
RBC # BLD AUTO: 3.07 10E12/L (ref 4.4–5.9)
RBC #/AREA URNS AUTO: 75 /HPF (ref 0–2)
RBC INCLUSIONS BLD: SLIGHT
RETICS # AUTO: 47.8 10E9/L (ref 25–95)
RETICS/RBC NFR AUTO: 1.6 % (ref 0.5–2)
SODIUM SERPL-SCNC: 141 MMOL/L (ref 133–144)
SODIUM UR-SCNC: 70 MMOL/L
SOURCE: ABNORMAL
SP GR UR STRIP: 1.01 (ref 1–1.03)
TSH SERPL DL<=0.005 MIU/L-ACNC: 3.74 MU/L (ref 0.4–4)
UROBILINOGEN UR STRIP-MCNC: 0 MG/DL (ref 0–2)
WBC # BLD AUTO: 6.6 10E9/L (ref 4–11)
WBC #/AREA URNS AUTO: >182 /HPF (ref 0–5)
WBC CLUMPS #/AREA URNS HPF: PRESENT /HPF

## 2018-10-12 PROCEDURE — 85060 BLOOD SMEAR INTERPRETATION: CPT | Performed by: HOSPITALIST

## 2018-10-12 PROCEDURE — 85045 AUTOMATED RETICULOCYTE COUNT: CPT | Performed by: EMERGENCY MEDICINE

## 2018-10-12 PROCEDURE — 00000146 ZZHCL STATISTIC GLUCOSE BY METER IP

## 2018-10-12 PROCEDURE — 96365 THER/PROPH/DIAG IV INF INIT: CPT

## 2018-10-12 PROCEDURE — 25000128 H RX IP 250 OP 636: Performed by: EMERGENCY MEDICINE

## 2018-10-12 PROCEDURE — 80076 HEPATIC FUNCTION PANEL: CPT | Performed by: EMERGENCY MEDICINE

## 2018-10-12 PROCEDURE — 25000132 ZZH RX MED GY IP 250 OP 250 PS 637: Performed by: HOSPITALIST

## 2018-10-12 PROCEDURE — 87086 URINE CULTURE/COLONY COUNT: CPT | Performed by: EMERGENCY MEDICINE

## 2018-10-12 PROCEDURE — 84100 ASSAY OF PHOSPHORUS: CPT | Performed by: EMERGENCY MEDICINE

## 2018-10-12 PROCEDURE — 25000128 H RX IP 250 OP 636: Performed by: HOSPITALIST

## 2018-10-12 PROCEDURE — 83036 HEMOGLOBIN GLYCOSYLATED A1C: CPT | Performed by: EMERGENCY MEDICINE

## 2018-10-12 PROCEDURE — 84443 ASSAY THYROID STIM HORMONE: CPT | Performed by: HOSPITALIST

## 2018-10-12 PROCEDURE — 81001 URINALYSIS AUTO W/SCOPE: CPT | Performed by: EMERGENCY MEDICINE

## 2018-10-12 PROCEDURE — 99223 1ST HOSP IP/OBS HIGH 75: CPT | Mod: AI | Performed by: HOSPITALIST

## 2018-10-12 PROCEDURE — 25000131 ZZH RX MED GY IP 250 OP 636 PS 637: Performed by: HOSPITALIST

## 2018-10-12 PROCEDURE — 84300 ASSAY OF URINE SODIUM: CPT | Performed by: HOSPITALIST

## 2018-10-12 PROCEDURE — 96361 HYDRATE IV INFUSION ADD-ON: CPT

## 2018-10-12 PROCEDURE — 84443 ASSAY THYROID STIM HORMONE: CPT | Performed by: EMERGENCY MEDICINE

## 2018-10-12 PROCEDURE — 12000000 ZZH R&B MED SURG/OB

## 2018-10-12 PROCEDURE — 76770 US EXAM ABDO BACK WALL COMP: CPT

## 2018-10-12 PROCEDURE — 87186 SC STD MICRODIL/AGAR DIL: CPT | Performed by: EMERGENCY MEDICINE

## 2018-10-12 PROCEDURE — 99285 EMERGENCY DEPT VISIT HI MDM: CPT | Mod: 25

## 2018-10-12 PROCEDURE — 85025 COMPLETE CBC W/AUTO DIFF WBC: CPT | Performed by: EMERGENCY MEDICINE

## 2018-10-12 PROCEDURE — 84156 ASSAY OF PROTEIN URINE: CPT | Performed by: HOSPITALIST

## 2018-10-12 PROCEDURE — 40000847 ZZHCL STATISTIC MORPHOLOGY W/INTERP HISTOLOGY TC 85060: Performed by: HOSPITALIST

## 2018-10-12 PROCEDURE — 93005 ELECTROCARDIOGRAM TRACING: CPT

## 2018-10-12 PROCEDURE — 87088 URINE BACTERIA CULTURE: CPT | Performed by: EMERGENCY MEDICINE

## 2018-10-12 PROCEDURE — 80048 BASIC METABOLIC PNL TOTAL CA: CPT | Performed by: EMERGENCY MEDICINE

## 2018-10-12 RX ORDER — CEFTRIAXONE 1 G/1
1 INJECTION, POWDER, FOR SOLUTION INTRAMUSCULAR; INTRAVENOUS EVERY 24 HOURS
Status: DISCONTINUED | OUTPATIENT
Start: 2018-10-13 | End: 2018-10-15 | Stop reason: HOSPADM

## 2018-10-12 RX ORDER — POLYETHYLENE GLYCOL 3350 17 G/17G
17 POWDER, FOR SOLUTION ORAL DAILY PRN
Status: DISCONTINUED | OUTPATIENT
Start: 2018-10-12 | End: 2018-10-15 | Stop reason: HOSPADM

## 2018-10-12 RX ORDER — ACETAMINOPHEN 325 MG/1
650 TABLET ORAL EVERY 4 HOURS PRN
Status: DISCONTINUED | OUTPATIENT
Start: 2018-10-12 | End: 2018-10-15 | Stop reason: HOSPADM

## 2018-10-12 RX ORDER — AMLODIPINE BESYLATE 10 MG/1
10 TABLET ORAL EVERY MORNING
COMMUNITY

## 2018-10-12 RX ORDER — AMLODIPINE BESYLATE 10 MG/1
10 TABLET ORAL EVERY MORNING
Status: DISCONTINUED | OUTPATIENT
Start: 2018-10-13 | End: 2018-10-15 | Stop reason: HOSPADM

## 2018-10-12 RX ORDER — NICOTINE POLACRILEX 4 MG
15-30 LOZENGE BUCCAL
Status: DISCONTINUED | OUTPATIENT
Start: 2018-10-12 | End: 2018-10-15 | Stop reason: HOSPADM

## 2018-10-12 RX ORDER — DEXTROSE MONOHYDRATE 25 G/50ML
25-50 INJECTION, SOLUTION INTRAVENOUS
Status: DISCONTINUED | OUTPATIENT
Start: 2018-10-12 | End: 2018-10-15 | Stop reason: HOSPADM

## 2018-10-12 RX ORDER — SIMVASTATIN 10 MG
10 TABLET ORAL AT BEDTIME
Status: DISCONTINUED | OUTPATIENT
Start: 2018-10-12 | End: 2018-10-15 | Stop reason: HOSPADM

## 2018-10-12 RX ORDER — LEVOTHYROXINE SODIUM 50 UG/1
50 TABLET ORAL DAILY
Status: DISCONTINUED | OUTPATIENT
Start: 2018-10-13 | End: 2018-10-15 | Stop reason: HOSPADM

## 2018-10-12 RX ORDER — AMOXICILLIN 250 MG
1 CAPSULE ORAL 2 TIMES DAILY PRN
Status: DISCONTINUED | OUTPATIENT
Start: 2018-10-12 | End: 2018-10-15 | Stop reason: HOSPADM

## 2018-10-12 RX ORDER — SODIUM CHLORIDE 9 MG/ML
INJECTION, SOLUTION INTRAVENOUS CONTINUOUS
Status: DISCONTINUED | OUTPATIENT
Start: 2018-10-12 | End: 2018-10-13

## 2018-10-12 RX ORDER — CEFTRIAXONE 1 G/1
1 INJECTION, POWDER, FOR SOLUTION INTRAMUSCULAR; INTRAVENOUS ONCE
Status: COMPLETED | OUTPATIENT
Start: 2018-10-12 | End: 2018-10-12

## 2018-10-12 RX ORDER — HYDRALAZINE HYDROCHLORIDE 20 MG/ML
10 INJECTION INTRAMUSCULAR; INTRAVENOUS EVERY 4 HOURS PRN
Status: DISCONTINUED | OUTPATIENT
Start: 2018-10-12 | End: 2018-10-15 | Stop reason: HOSPADM

## 2018-10-12 RX ORDER — BISACODYL 10 MG
10 SUPPOSITORY, RECTAL RECTAL DAILY PRN
Status: DISCONTINUED | OUTPATIENT
Start: 2018-10-12 | End: 2018-10-15 | Stop reason: HOSPADM

## 2018-10-12 RX ORDER — ASPIRIN 81 MG/1
81 TABLET, CHEWABLE ORAL DAILY
Status: DISCONTINUED | OUTPATIENT
Start: 2018-10-13 | End: 2018-10-15 | Stop reason: HOSPADM

## 2018-10-12 RX ORDER — ONDANSETRON 4 MG/1
4 TABLET, ORALLY DISINTEGRATING ORAL EVERY 6 HOURS PRN
Status: DISCONTINUED | OUTPATIENT
Start: 2018-10-12 | End: 2018-10-15 | Stop reason: HOSPADM

## 2018-10-12 RX ORDER — CLONIDINE HYDROCHLORIDE 0.1 MG/1
0.1 TABLET ORAL DAILY
Status: DISCONTINUED | OUTPATIENT
Start: 2018-10-13 | End: 2018-10-15 | Stop reason: HOSPADM

## 2018-10-12 RX ORDER — PROCHLORPERAZINE 25 MG
12.5 SUPPOSITORY, RECTAL RECTAL EVERY 12 HOURS PRN
Status: DISCONTINUED | OUTPATIENT
Start: 2018-10-12 | End: 2018-10-15 | Stop reason: HOSPADM

## 2018-10-12 RX ORDER — PROCHLORPERAZINE MALEATE 5 MG
5 TABLET ORAL EVERY 6 HOURS PRN
Status: DISCONTINUED | OUTPATIENT
Start: 2018-10-12 | End: 2018-10-15 | Stop reason: HOSPADM

## 2018-10-12 RX ORDER — SODIUM CHLORIDE 9 MG/ML
1000 INJECTION, SOLUTION INTRAVENOUS CONTINUOUS
Status: DISCONTINUED | OUTPATIENT
Start: 2018-10-12 | End: 2018-10-12 | Stop reason: DRUGHIGH

## 2018-10-12 RX ORDER — AMOXICILLIN 250 MG
2 CAPSULE ORAL 2 TIMES DAILY PRN
Status: DISCONTINUED | OUTPATIENT
Start: 2018-10-12 | End: 2018-10-15 | Stop reason: HOSPADM

## 2018-10-12 RX ORDER — ONDANSETRON 2 MG/ML
4 INJECTION INTRAMUSCULAR; INTRAVENOUS EVERY 6 HOURS PRN
Status: DISCONTINUED | OUTPATIENT
Start: 2018-10-12 | End: 2018-10-15 | Stop reason: HOSPADM

## 2018-10-12 RX ORDER — NALOXONE HYDROCHLORIDE 0.4 MG/ML
.1-.4 INJECTION, SOLUTION INTRAMUSCULAR; INTRAVENOUS; SUBCUTANEOUS
Status: DISCONTINUED | OUTPATIENT
Start: 2018-10-12 | End: 2018-10-15 | Stop reason: HOSPADM

## 2018-10-12 RX ORDER — METOPROLOL SUCCINATE 100 MG/1
100 TABLET, EXTENDED RELEASE ORAL DAILY
Status: DISCONTINUED | OUTPATIENT
Start: 2018-10-13 | End: 2018-10-15 | Stop reason: HOSPADM

## 2018-10-12 RX ADMIN — INSULIN GLARGINE 25 UNITS: 100 INJECTION, SOLUTION SUBCUTANEOUS at 22:29

## 2018-10-12 RX ADMIN — SODIUM CHLORIDE 1000 ML: 9 INJECTION, SOLUTION INTRAVENOUS at 14:52

## 2018-10-12 RX ADMIN — CEFTRIAXONE SODIUM 1 G: 1 INJECTION, POWDER, FOR SOLUTION INTRAMUSCULAR; INTRAVENOUS at 16:08

## 2018-10-12 RX ADMIN — SIMVASTATIN 10 MG: 10 TABLET, FILM COATED ORAL at 22:29

## 2018-10-12 RX ADMIN — SODIUM CHLORIDE: 9 INJECTION, SOLUTION INTRAVENOUS at 19:57

## 2018-10-12 ASSESSMENT — ACTIVITIES OF DAILY LIVING (ADL): ADLS_ACUITY_SCORE: 13

## 2018-10-12 ASSESSMENT — ENCOUNTER SYMPTOMS
HEMATURIA: 0
FREQUENCY: 1
DYSURIA: 0
DIFFICULTY URINATING: 0

## 2018-10-12 NOTE — ED NOTES
[facility]  ED Nurse Handoff Report    Brady Oviedo is a 73 year old male   ED Chief complaint: Abnormal Labs  . ED Diagnosis:   Final diagnoses:   Renal failure, unspecified chronicity   Urinary tract infection without hematuria, site unspecified   Anemia, unspecified type     Allergies: No Known Allergies    Code Status: Full Code  Activity level - Baseline/Home:  Independent. Activity Level - Current:   Independent. Lift room needed: No. Bariatric: No   Needed: No   Isolation: No. Infection: Not Applicable.     Vital Signs:   Vitals:    10/12/18 1456 10/12/18 1500 10/12/18 1515 10/12/18 1530   BP:  112/70 103/65 101/68   Resp:       Temp:       TempSrc:       SpO2: 98% 98% 98% 96%   Weight:           Cardiac Rhythm:  ,      Pain level: 0-10 Pain Scale: 0  Patient confused: No. Patient Falls Risk: No.   Elimination Status: Has voided   Patient Report - Initial complaint Pt presents with a history of diabetes, HTN, hyperlipidemia who presents with abnormal labs. The patient just started seeing a new PCP and was called today by his PCP office telling him his kidney function and PSA levels are high and he should come to the ED. The patient denies any physical symptoms other than more frequent urination recently.  .  Focused Assessment: Pt alert and orientated. Denies any pain. Skin dry, scaly, pale and intact. Lungs clear. Denies shortness of breath. Heart tones regular. No edema. No difficulty voiding.   Tests Performed:   No orders to display      Abnormal Results:   Labs Ordered and Resulted from Time of ED Arrival Up to the Time of Departure from the ED   CBC WITH PLATELETS DIFFERENTIAL - Abnormal; Notable for the following:        Result Value    RBC Count 3.07 (*)     Hemoglobin 8.9 (*)     Hematocrit 28.2 (*)     All other components within normal limits   BASIC METABOLIC PANEL - Abnormal; Notable for the following:     Chloride 111 (*)     Glucose 176 (*)     Urea Nitrogen 69 (*)      Creatinine 4.27 (*)     GFR Estimate 14 (*)     GFR Estimate If Black 17 (*)     Calcium 7.0 (*)     All other components within normal limits   ROUTINE UA WITH MICROSCOPIC - Abnormal; Notable for the following:     Glucose Urine 150 (*)     Blood Urine Small (*)     Protein Albumin Urine 100 (*)     Leukocyte Esterase Urine Large (*)     WBC Urine >182 (*)     RBC Urine 75 (*)     WBC Clumps Present (*)     Bacteria Urine Few (*)     All other components within normal limits   HEPATIC PANEL   PERIPHERAL IV CATHETER   URINE CULTURE AEROBIC BACTERIAL      Treatments provided: Iv 18 G placed.   Family Comments: Wife at bedside.   OBS brochure/video discussed/provided to patient:  N/A  ED Medications:   Medications   0.9% sodium chloride BOLUS (0 mLs Intravenous Stopped 10/12/18 1607)     Followed by   sodium chloride 0.9% infusion (not administered)   cefTRIAXone (ROCEPHIN) 1 g vial to attach to  mL bag for ADULTS or NS 50 mL bag for PEDS (1 g Intravenous New Bag 10/12/18 1608)     Drips infusing:  No  For the majority of the shift, the patient's behavior Green. Interventions performed were n/a.     Severe Sepsis OR Septic Shock Diagnosis Present: No      ED Nurse Name/Phone Number: Netta KATHY Morton,   4:25 PM RECEIVING UNIT ED HANDOFF REVIEW    Above ED Nurse Handoff Report was reviewed: Yes  Reviewed by: Mary Kay Vlilareal on October 12, 2018 at 5:25 PM

## 2018-10-12 NOTE — IP AVS SNAPSHOT
MRN:[id number]                      After Visit Summary   10/12/2018    Brady Oviedo    MRN: [id number]           Thank you!     Thank you for choosing [facility] for your care. Our goal is always to provide you with excellent care. Hearing back from our patients is one way we can continue to improve our services. Please take a few minutes to complete the written survey that you may receive in the mail after you visit. If you would like to speak to someone directly about your visit please contact Patient Relations at [phone]. Thank you!          Patient Information     Date Of Birth          1945        Designated Caregiver       Most Recent Value    Caregiver    Will someone help with your care after discharge? yes    Name of designated caregiver Jasmine, wife    Phone number of caregiver home    Caregiver address home      About your hospital stay     You were admitted on:  October 12, 2018 You last received care in the:  [place] [facility] [address] [facility]    You were discharged on:  October 15, 2018        Reason for your hospital stay       Renal failure acute on chronic                  Who to Call     For medical emergencies, please call 911.  For non-urgent questions about your medical care, please call your primary care provider or clinic, [phone]          Attending Provider     Provider Specialty    Austin Iniguez MD Emergency Medicine    Elian Brandt DO Internal Medicine       Primary Care Provider Office Phone # Fax #    Negrito Jose [phone]      After Care Instructions     Diet       Follow this diet upon discharge: Orders Placed This Encounter      Combination Diet Renal Diet; 5556-4934 Calories: Moderate Consistent CHO (4-6 CHO units/meal)                  Follow-up Appointments     Follow-up and recommended labs and tests        Follow-up with primary care physician in 2-3 days with a repeat basic metabolic  "panel  Follow-up with urology and nephrology in the next 2 weeks                  Pending Results     Date and Time Order Name Status Description    10/13/2018 0654 Vitamin D Deficiency In process             Statement of Approval     Ordered          10/15/18 8698  I have reviewed and agree with all the recommendations and orders detailed in this document.  EFFECTIVE NOW     Approved and electronically signed by:  Dillon Chery MD             Admission Information     Date & Time Provider Department Dept. Phone    10/12/2018 Elian Brandt DO [place] [address] [facility] [phone]      Your Vitals Were     Blood Pressure Temperature Respirations Height Weight Pulse Oximetry    169/67 (BP Location: Right arm) 97.1  F (36.2  C) (Oral) 18 1.829 m (6') 79.4 kg (175 lb) 98%    BMI (Body Mass Index)                   23.73 kg/m2           MyChart Information     [facility] lets you send messages to your doctor, view your test results, renew your prescriptions, schedule appointments and more. To sign up, go to [email or web address] . Click on \"Log in\" on the left side of the screen, which will take you to the Welcome page. Then click on \"Sign up Now\" on the right side of the page.     You will be asked to enter the access code listed below, as well as some personal information. Please follow the directions to create your username and password.     Your access code is: [email or web address]  Expires: 2019  1:50 PM     Your access code will  in 90 days. If you need help or a new code, please call your [place] clinic or [phone].        Care EveryWhere ID     This is your Care EveryWhere ID. This could be used by other organizations to access your [place] medical records  [id number]        Equal Access to Services     PETROS NAJERA : Hannah Ruiz, mode maciel, villa alejandre, america boss. So [facility] [phone].    ATENCIÓN: Si habla " español, tiene a summers disposición servicios gratuitos de asistencia lingüística. Ruben garcia [phone].    We comply with applicable federal civil rights laws and [place] laws. We do not discriminate on the basis of race, color, national origin, age, disability, sex, sexual orientation, or gender identity.               Review of your medicines      START taking        Dose / Directions    furosemide 20 MG tablet   Commonly known as:  LASIX   Used for:  Renal failure, unspecified chronicity        Dose:  20 mg   Start taking on:  10/16/2018   Take 1 tablet (20 mg) by mouth daily   Quantity:  30 tablet   Refills:  0       insulin lispro 100 UNIT/ML injection   Commonly known as:  humaLOG   Used for:  Type II diabetes mellitus with peripheral circulatory disorder (H)        For Pre-Meal  - 189 give 1 unit.  For Pre-Meal  - 239 give 2 units.  For Pre-Meal  - 289 give 3 units.  For Pre-Meal  - 339 give 4 units.  For Pre-Meal -399 give 5 units. For Pre-Meal -449 give 6 units. For Pre-Meal BG = or > 450 give 7 units.   Quantity:  1 vial   Refills:  0       levofloxacin 500 MG tablet   Commonly known as:  LEVAQUIN   Used for:  Urinary tract infection without hematuria, site unspecified        Dose:  500 mg   Take 1 tablet (500 mg) by mouth every 48 hours for 2 doses   Quantity:  2 tablet   Refills:  0       sodium bicarbonate 650 MG tablet   Used for:  Renal failure, unspecified chronicity        Dose:  1300 mg   Take 2 tablets (1,300 mg) by mouth 2 times daily   Quantity:  60 tablet   Refills:  0       tamsulosin 0.4 MG capsule   Commonly known as:  FLOMAX   Used for:  Benign prostatic hyperplasia with lower urinary tract symptoms, symptom details unspecified        Dose:  0.4 mg   Start taking on:  10/16/2018   Take 1 capsule (0.4 mg) by mouth daily   Quantity:  30 capsule   Refills:  0         CONTINUE these medicines which may have CHANGED, or have new prescriptions. If we are  uncertain of the size of tablets/capsules you have at home, strength may be listed as something that might have changed.        Dose / Directions    BASAGLAR 100 UNIT/ML injection   This may have changed:  See the new instructions.   Used for:  Type II diabetes mellitus with peripheral circulatory disorder (H)        Dose:  15 Units   Inject 15 Units Subcutaneous At Bedtime   Quantity:  15 mL   Refills:  0         CONTINUE these medicines which have NOT CHANGED        Dose / Directions    amLODIPine 10 MG tablet   Commonly known as:  NORVASC        Dose:  10 mg   Take 10 mg by mouth every morning   Refills:  0       aspirin 81 MG chewable tablet   Used for:  Atrial fibrillation (H)        Dose:  81 mg   Take 1 tablet (81 mg) by mouth daily   Quantity:  90 tablet   Refills:  4       blood glucose calibration solution   Used for:  Type 2 diabetes, HbA1C goal < 8% (H)        Use to calibrate blood glucose monitor as directed.   Quantity:  1 each   Refills:  0       blood glucose monitoring lancets   Used for:  Diabetic polyneuropathy associated with diabetes mellitus due to underlying condition (H)        Use to test blood sugar 3-4 times daily or as directed.   Quantity:  3 Box   Refills:  3       blood glucose monitoring meter device kit   Used for:  Type 2 diabetes, HbA1C goal < 8% (H)        Use to test blood sugars 3 times daily or as directed.   Quantity:  1 kit   Refills:  0       blood glucose monitoring test strip   Commonly known as:  ACCU-CHEK COMPACT DRUM   Used for:  Diabetic polyneuropathy associated with diabetes mellitus due to underlying condition (H)        Use to test blood sugars 3 times daily or as directed.   Quantity:  100 each   Refills:  3       cloNIDine 0.1 MG tablet   Commonly known as:  CATAPRES   Used for:  Benign essential hypertension        TAKE 1 TABLET BY MOUTH DAILY   Quantity:  90 tablet   Refills:  0       ferrous sulfate 325 (65 Fe) MG tablet   Commonly known as:  IRON   Used for:   Iron deficiency anemia        Dose:  325 mg   Take 1 tablet (325 mg) by mouth daily (with breakfast)   Quantity:  30 tablet   Refills:  2       * insulin pen needle 31G X 6 MM   Used for:  Diabetic polyneuropathy associated with diabetes mellitus due to underlying condition (H)        Daily insulin injections   Quantity:  100 each   Refills:  0       * B-D U/F 31G X 8 MM   Used for:  Diabetic polyneuropathy associated with diabetes mellitus due to underlying condition (H)   Generic drug:  insulin pen needle        USE 1 NEEDLE EVERY DAY OR AS DIRECTED   Quantity:  30 each   Refills:  0       levothyroxine 50 MCG tablet   Commonly known as:  SYNTHROID/LEVOTHROID   Used for:  Other specified hypothyroidism        TAKE 1 TABLET BY MOUTH DAILY   Quantity:  90 tablet   Refills:  0       metoprolol succinate 100 MG 24 hr tablet   Commonly known as:  TOPROL-XL   Used for:  Benign essential hypertension        TAKE 1 TABLET(100 MG) BY MOUTH DAILY   Quantity:  90 tablet   Refills:  0       simvastatin 10 MG tablet   Commonly known as:  ZOCOR   Used for:  Hyperlipidemia with target LDL less than 100        TAKE 1 TABLET(10 MG) BY MOUTH AT BEDTIME   Quantity:  90 tablet   Refills:  0       * Notice:  This list has 2 medication(s) that are the same as other medications prescribed for you. Read the directions carefully, and ask your doctor or other care provider to review them with you.      STOP taking     chlorthalidone 25 MG tablet   Commonly known as:  HYGROTON                Where to get your medicines      These medications were sent to [facility] [place], [place] - [address] [address] [address]     Phone:  [phone]     BASAGLAR 100 UNIT/ML injection    furosemide 20 MG tablet    levofloxacin 500 MG tablet    sodium bicarbonate 650 MG tablet    tamsulosin 0.4 MG capsule         Some of these will need a paper prescription and others can be bought over the  counter. Ask your nurse if you have questions.     Bring a paper prescription for each of these medications     insulin lispro 100 UNIT/ML injection                Protect others around you: Learn how to safely use, store and throw away your medicines at www.disposemymeds.org.        ANTIBIOTIC INSTRUCTION     You've Been Prescribed an Antibiotic - Now What?  Your healthcare team thinks that you or your loved one might have an infection. Some infections can be treated with antibiotics, which are powerful, life-saving drugs. Like all medications, antibiotics have side effects and should only be used when necessary. There are some important things you should know about your antibiotic treatment.      Your healthcare team may run tests before you start taking an antibiotic.    Your team may take samples (e.g., from your blood, urine or other areas) to run tests to look for bacteria. These test can be important to determine if you need an antibiotic at all and, if you do, which antibiotic will work best.      Within a few days, your healthcare team might change or even stop your antibiotic.    Your team may start you on an antibiotic while they are working to find out what is making you sick.    Your team might change your antibiotic because test results show that a different antibiotic would be better to treat your infection.    In some cases, once your team has more information, they learn that you do not need an antibiotic at all. They may find out that you don't have an infection, or that the antibiotic you're taking won't work against your infection. For example, an infection caused by a virus can't be treated with antibiotics. Staying on an antibiotic when you don't need it is more likely to be harmful than helpful.      You may experience side effects from your antibiotic.    Like all medications, antibiotics have side effects. Some of these can be serious.    Let you healthcare team know if you have any known  allergies when you are admitted to the hospital.    One significant side effect of nearly all antibiotics is the risk of severe and sometimes deadly diarrhea caused by Clostridium difficile (C. Difficile). This occurs when a person takes antibiotics because some good germs are destroyed. Antibiotic use allows C. diificile to take over, putting patients at high risk for this serious infection.    As a patient or caregiver, it is important to understand your or your loved one's antibiotic treatment. It is especially important for caregivers to speak up when patients can't speak for themselves. Here are some important questions to ask your healthcare team.    What infection is this antibiotic treating and how do you know I have that infection?    What side effects might occur from this antibiotic?    How long will I need to take this antibiotic?    Is it safe to take this antibiotic with other medications or supplements (e.g., vitamins) that I am taking?     Are there any special directions I need to know about taking this antibiotic? For example, should I take it with food?    How will I be monitored to know whether my infection is responding to the antibiotic?    What tests may help to make sure the right antibiotic is prescribed for me?      Information provided by:  www.cdc.gov/getsmart  U.S. Department of Health and Human Services  Centers for disease Control and Prevention  National Center for Emerging and Zoonotic Infectious Diseases  Division of Healthcare Quality Promotion             Medication List: This is a list of all your medications and when to take them. Check marks below indicate your daily home schedule. Keep this list as a reference.      Medications           Morning Afternoon Evening Bedtime As Needed    amLODIPine 10 MG tablet   Commonly known as:  NORVASC   Take 10 mg by mouth every morning   Last time this was given:  10 mg on 10/15/2018  8:45 AM   Next Dose Due:  10/16- morning                                    aspirin 81 MG chewable tablet   Take 1 tablet (81 mg) by mouth daily   Last time this was given:  81 mg on 10/15/2018  8:45 AM   Next Dose Due:  10/16- morning                                   BASAGLAR 100 UNIT/ML injection   Inject 15 Units Subcutaneous At Bedtime                                   blood glucose calibration solution   Use to calibrate blood glucose monitor as directed.                                blood glucose monitoring lancets   Use to test blood sugar 3-4 times daily or as directed.                                blood glucose monitoring meter device kit   Use to test blood sugars 3 times daily or as directed.                                blood glucose monitoring test strip   Commonly known as:  ACCU-CHEK COMPACT DRUM   Use to test blood sugars 3 times daily or as directed.                                cloNIDine 0.1 MG tablet   Commonly known as:  CATAPRES   TAKE 1 TABLET BY MOUTH DAILY   Last time this was given:  0.1 mg on 10/15/2018  8:45 AM   Next Dose Due:  10/16- morning                                   ferrous sulfate 325 (65 Fe) MG tablet   Commonly known as:  IRON   Take 1 tablet (325 mg) by mouth daily (with breakfast)   Next Dose Due:  10/16- morning                                   furosemide 20 MG tablet   Commonly known as:  LASIX   Take 1 tablet (20 mg) by mouth daily   Start taking on:  10/16/2018   Last time this was given:  20 mg on 10/15/2018  8:45 AM   Next Dose Due:  10/16- morning                                   insulin lispro 100 UNIT/ML injection   Commonly known as:  humaLOG   For Pre-Meal  - 189 give 1 unit.  For Pre-Meal  - 239 give 2 units.  For Pre-Meal  - 289 give 3 units.  For Pre-Meal  - 339 give 4 units.  For Pre-Meal -399 give 5 units. For Pre-Meal -449 give 6 units. For Pre-Meal BG = or > 450 give 7 units.                                         * insulin pen needle 31G X 6 MM   Daily insulin  injections                                * B-D U/F 31G X 8 MM   USE 1 NEEDLE EVERY DAY OR AS DIRECTED   Generic drug:  insulin pen needle                                levofloxacin 500 MG tablet   Commonly known as:  LEVAQUIN   Take 1 tablet (500 mg) by mouth every 48 hours for 2 doses                                   levothyroxine 50 MCG tablet   Commonly known as:  SYNTHROID/LEVOTHROID   TAKE 1 TABLET BY MOUTH DAILY   Last time this was given:  50 mcg on 10/15/2018  8:45 AM   Next Dose Due:  10/16- morning                                   metoprolol succinate 100 MG 24 hr tablet   Commonly known as:  TOPROL-XL   TAKE 1 TABLET(100 MG) BY MOUTH DAILY   Last time this was given:  100 mg on 10/15/2018  8:44 AM   Next Dose Due:  10/16- morning                                   simvastatin 10 MG tablet   Commonly known as:  ZOCOR   TAKE 1 TABLET(10 MG) BY MOUTH AT BEDTIME   Last time this was given:  10 mg on 10/14/2018  9:42 PM   Next Dose Due:  10/15- bedtime                                   sodium bicarbonate 650 MG tablet   Take 2 tablets (1,300 mg) by mouth 2 times daily   Last time this was given:  1,300 mg on 10/15/2018  8:44 AM   Next Dose Due:  10/15- evening                                      tamsulosin 0.4 MG capsule   Commonly known as:  FLOMAX   Take 1 capsule (0.4 mg) by mouth daily   Start taking on:  10/16/2018   Last time this was given:  0.4 mg on 10/15/2018  8:45 AM   Next Dose Due:  10/16- morning                                     * Notice:  This list has 2 medication(s) that are the same as other medications prescribed for you. Read the directions carefully, and ask your doctor or other care provider to review them with you.

## 2018-10-12 NOTE — PHARMACY-ADMISSION MEDICATION HISTORY
Admission medication history interview status for this patient is complete. See [facility] admission navigator for allergy information, prior to admission medications and immunization status.     Medication history interview source(s):Patient  Medication history resources (including written lists, pill bottles, clinic record):None    Changes made to PTA medication list:  Added: none  Deleted: lantus zocor (duplicate)  Changed: norvasc from qpm to qam    Actions taken by pharmacist (provider contacted, etc):None     Additional medication history information:None    Medication reconciliation/reorder completed by provider prior to medication history? No    For patients on insulin therapy: Yes--but RN waiting (Yes/No)   Lantus/levemir/NPH/Mix 70/30 dose: ___ in AM/PM or twice daily   Sliding scale Novolog Y/N   If Yes, do you have a baseline novolog pre-meal dose: ______units with meals   Patients eat three meals a day: Y/N ---  How many episodes of hypoglycemia (low blood glucose) do you have weekly: ---   How many missed doses do you have a week: ---  How many times do you check your blood glucose per day: ---  Any Barriers to therapy: cost of medications/comfortable with giving injections (if applicable)/ comfortable and confident with current diabetes regimen ---      Prior to Admission medications    Medication Sig Last Dose Taking? Auth Provider   amLODIPine (NORVASC) 10 MG tablet Take 10 mg by mouth every morning 10/12/2018 at Unknown time Yes Unknown, Entered By History   aspirin 81 MG chewable tablet Take 1 tablet (81 mg) by mouth daily 10/12/2018 at am Yes Mauri Izaguirre MD   BASAGLAR 100 UNIT/ML injection INJECT 25 UNITS SUBCUTANEOUSLY AT BEDTIME 10/11/2018 at Unknown time Yes Angeles Friedman NP   chlorthalidone (HYGROTON) 25 MG tablet TAKE 1 TABLET BY MOUTH DAILY 10/11/2018 at Unknown time Yes Angeles Friedman NP   cloNIDine (CATAPRES) 0.1 MG tablet TAKE 1 TABLET BY MOUTH DAILY 10/12/2018 at  am Yes Angeles Friedman NP   ferrous sulfate (IRON) 325 (65 FE) MG tablet Take 1 tablet (325 mg) by mouth daily (with breakfast) 10/12/2018 at am Yes Angeles Friedman NP   levothyroxine (SYNTHROID/LEVOTHROID) 50 MCG tablet TAKE 1 TABLET BY MOUTH DAILY 10/12/2018 at am Yes Angeles Friedman NP   metoprolol succinate (TOPROL-XL) 100 MG 24 hr tablet TAKE 1 TABLET(100 MG) BY MOUTH DAILY 10/12/2018 at am Yes Angeles Friedman NP   simvastatin (ZOCOR) 10 MG tablet TAKE 1 TABLET(10 MG) BY MOUTH AT BEDTIME 10/11/2018 at Unknown time Yes Angeles Friedman NP   B-D U/F 31G X 8 MM insulin pen needle USE 1 NEEDLE EVERY DAY OR AS DIRECTED   Angeles Friedman NP   Blood Glucose Calibration (ACCU-CHEK COMPACT BLUE CONTROL) LIQD Use to calibrate blood glucose monitor as directed.   Angeles Friedman NP   blood glucose monitoring (ACCU-CHEK COMPACT DRUM) test strip Use to test blood sugars 3 times daily or as directed.   Angeles Friedman NP   blood glucose monitoring (ACCU-CHEK MULTICLIX) lancets Use to test blood sugar 3-4 times daily or as directed.   Angeles Friedman NP   Blood Glucose Monitoring Suppl (ACCU-CHEK COMPACT CARE KIT) KIT Use to test blood sugars 3 times daily or as directed.   Angeles Friedman NP   insulin pen needle 31G X 6 MM Daily insulin injections   Angeles Friedman NP

## 2018-10-12 NOTE — ED NOTES
"Pt has no symptoms.  Pt is here due to clinic calling patient stating he had abnormal labs and that he needed further evaluation.  Pt states that he believes it was elevated \"prostate labs and kidney labs.\"  Pt does appear pale.  At times, breathing appears labored.    "

## 2018-10-12 NOTE — Clinical Note
Admitting Physician: JUDY VILLAFANA [[id number]]   [id number]   Bed Type: Adult Med/Surg [46]   Special needs: Fall Risk [8]   Bed request comments: Med bed request@[id number] - independent TF

## 2018-10-12 NOTE — ED TRIAGE NOTES
Pt presents with abnormal labs that were drawn on Wednesday. Pt was called by his physician and told to come to ED for abnormal ALT and kidney labs. Pt denies pain or N/V. ABCs intact.

## 2018-10-12 NOTE — IP AVS SNAPSHOT
[place] [address] [facility]    [address]    [facility] [address]    Phone:  [phone]    Fax:  [phone]                                       After Visit Summary   10/12/2018    Brady Oviedo    MRN: [id number]           After Visit Summary Signature Page     I have received my discharge instructions, and my questions have been answered. I have discussed any challenges I see with this plan with the nurse or doctor.    ..........................................................................................................................................  Patient/Patient Representative Signature      ..........................................................................................................................................  Patient Representative Print Name and Relationship to Patient    ..................................................               ................................................  Date                                   Time    ..........................................................................................................................................  Reviewed by Signature/Title    ...................................................              ..............................................  Date                                               Time          22EPIC Rev 08/18

## 2018-10-12 NOTE — H&P
[facility]  Hospitalist H&P    Name: Brady Oviedo      MRN: [id number]  YOB: 1945    Age: 73 year old  Date of admission: 10/12/2018  Primary care provider: Negrito Jose            Assessment and Plan:   Brady Oviedo is a 73 year old male with a history of atrial fibrillation and AV block Mobitz type II status post pacemaker implantation, type 2 diabetes mellitus, diabetic neuropathy, hypertension, hyperlipidemia, chronic kidney disease who presents with abnormal labs.    1.  Acute versus chronic renal failure with nephrotic range proteinuria: Creatinine in March 2017 was 2.6.  Currently it is 4.3.  I have no values in between that time.  I suspect this is probably progression of chronic renal failure.  Electrolytes and volume status are reasonable.  He is not acidotic.  Nothing to suggest uremic symptoms.  We will add on a phosphorus levels and check a FENa.  Provide normal saline overnight to see if there is any reversible component to his renal failure.  To note he does endorse urinary frequency in the context of a rising PSA so he could have some obstructive uropathy.  Last year he had a random urine protein of greater than 4 g.  We will repeat this.  He is not edematous and otherwise shows no clinical signs of nephrotic syndrome.  Will check a fasting lipid panel in the morning as well.  Will request nephrology consultation to guide further workup and arrange outpatient follow-up.    2.  Normocytic anemia: Again he has had about a 2 g drop in his hemoglobin over the last 1.5 years.  He denies any bleeding.  I suspect this is anemia of chronic kidney disease.  Will check iron studies, B12/folate, LDH, haptoglobin, reticulocyte count, and blood smear for the sake of completion.  He has no symptoms associated with his anemia and vital signs are stable so no transfusion is warranted.    3.  Urinary tract infection: Grossly abnormal urinalysis in the ED.  Start ceftriaxone  and follow-up urine culture.    4.  Hypertension: Blood pressure is reasonable at this time but slightly elevated.  Will resume his amlodipine 10 mg daily, clonidine 0.1 mg daily, and metoprolol succinate 100 mg daily.  Hold his chlorthalidone in the context of renal dysfunction.    5.  Elevated PSA with urinary frequency: Differential diagnosis would include BPH versus prostate cancer.  He clearly needs outpatient urology follow-up and likely prostate biopsy.    6.  Diabetes mellitus: Current blood sugars reasonable.  Continue his Lantus 15 units at bedtime as well as a medium sliding scale insulin.    7.  History of atrial fibrillation and Mobitz type II AV block and sick sinus syndrome status post pacemaker implantation: Followed by cardiology.  Appears as though his device is functioning properly.  We will continue his prior to admission metoprolol.  No further changes.    8.  Hypothyroidism: Check TSH and continue his levothyroxine 50 mcg daily.    Code status: Full, discussed with patient at bedside.  Admit to inpatient status.  Prophylaxis: PCD's.  Disposition: Home 1-2 days.            Chief Complaint:   Abnormal labs.         History of Present Illness:   Brady Oviedo is a 73 year old male who presents with abnormal labs.  Case was discussed with the patient and his wife at the bedside.  I also discussed the case with the ED physician Dr. Iniguez.  The EMR was also reviewed.    Patient has not had regular medical care for about a year and a half.  He went to [facility] today for routine lab work and was found to have an elevated creatinine as well as an elevated PSA.  He was directed to the emergency department by his outpatient nephrologist for further workup.    The patient denies headaches, visual changes, dizziness, and lightheadedness. No fevers, chills, or sweats. No neck or back pain. No chest pain/pressure, palpitations, shortness of breath or cough. The patient denies abdominal/pelvic  pain, nausea, vomiting, diarrhea, and constipation. No dysuria, hematuria, but does endorse urinary frequency or urgency. No focal weakness, numbness, paresthesias, or issues with coordination. No arthralgias nor myalgias. No suicidal thoughts/statements and denies auditory or visual hallucinations. The patient has no other complaints at this time.            Past Medical History:     Past Medical History:   Diagnosis Date     Atrial fibrillation (H)      AV block, Mobitz 2      Diabetes mellitus type 2 with complications (H)      Diabetic neuropathy (H)      HTN (hypertension)      Hyperlipidemia LDL goal < 130              Past Surgical History:     Past Surgical History:   Procedure Laterality Date     AMPUTATE TOE(S)  11/24/2013    Procedure: AMPUTATE TOE(S);  2ND RAY AMPUTATION OF LT FOOT ;  Surgeon: Clint Alvarez DPM;  Location: [facility] OR     INCISION AND DRAINAGE BONE LOWER EXTREMITY, COMBINED  11/26/2013    Procedure: COMBINED INCISION AND DRAINAGE BONE LOWER EXTREMITY;  REVISIONAL LEFT FOOT INCISION AND DRAINAGE WITH BONE DEBRIDEMENT AND FLAP CLOSURE ;  Surgeon: Evonne Allison DPM, Pod;  Location: [facility] OR     internal pacemaker  3/25/14             Social History:     Social History   Substance Use Topics     Smoking status: Never Smoker     Smokeless tobacco: Never Used     Alcohol use Yes      Comment: rare             Family History:   The family history was fully reviewed and non-contributory in this case.         Allergies:   No Known Allergies          Medications:     Prior to Admission medications    Medication Sig Last Dose Taking? Auth Provider   amLODIPine (NORVASC) 10 MG tablet TAKE 1 TABLET BY MOUTH EVERY EVENING   Angeles Friedman NP   aspirin 81 MG chewable tablet Take 1 tablet (81 mg) by mouth daily   Mauri Izaguirre MD   B-D U/F 31G X 8 MM insulin pen needle USE 1 NEEDLE EVERY DAY OR AS DIRECTED   Angeles Friedman NP   BASAGLAR 100 UNIT/ML injection INJECT 25 UNITS  SUBCUTANEOUSLY AT BEDTIME   Angeles Friedman NP   Blood Glucose Calibration (ACCU-CHEK COMPACT BLUE CONTROL) LIQD Use to calibrate blood glucose monitor as directed.   Angeles Friedman NP   blood glucose monitoring (ACCU-CHEK COMPACT DRUM) test strip Use to test blood sugars 3 times daily or as directed.   Angeles Friedman NP   blood glucose monitoring (ACCU-CHEK MULTICLIX) lancets Use to test blood sugar 3-4 times daily or as directed.   Angeles Friedman NP   Blood Glucose Monitoring Suppl (ACCU-CHEK COMPACT CARE KIT) KIT Use to test blood sugars 3 times daily or as directed.   Angeles Friedman NP   chlorthalidone (HYGROTON) 25 MG tablet TAKE 1 TABLET BY MOUTH DAILY   Angeles Friedman NP   cloNIDine (CATAPRES) 0.1 MG tablet TAKE 1 TABLET BY MOUTH DAILY   Angeles Friedman NP   ferrous sulfate (IRON) 325 (65 FE) MG tablet Take 1 tablet (325 mg) by mouth daily (with breakfast)   Angeles Friedman NP   insulin pen needle 31G X 6 MM Daily insulin injections   Angeles Friedman NP   LANTUS SOLOSTAR 100 UNIT/ML soln 15 units at bedtime   Angeles Friedman NP   levothyroxine (SYNTHROID/LEVOTHROID) 50 MCG tablet TAKE 1 TABLET BY MOUTH DAILY   Angeles Friedman NP   metoprolol succinate (TOPROL-XL) 100 MG 24 hr tablet TAKE 1 TABLET(100 MG) BY MOUTH DAILY   Angeles Friedman NP   simvastatin (ZOCOR) 10 MG tablet TAKE 1 TABLET(10 MG) BY MOUTH AT BEDTIME   Angeles Friedman NP   simvastatin (ZOCOR) 10 MG tablet TAKE 1 TABLET(10 MG) BY MOUTH AT BEDTIME   Angeles Friedman NP             Review of Systems:   A Comprehensive greater than 10 system review of systems was carried out.  Pertinent positives and negatives are noted above.  Otherwise negative for contributory information.           Physical Exam:   Blood pressure 137/75, temperature 98  F (36.7  C), temperature source Temporal, resp. rate 16, weight 80 kg (176 lb 5.9 oz), SpO2 98 %.  Wt Readings from Last 1  Encounters:   10/12/18 80 kg (176 lb 5.9 oz)     Exam:  GENERAL: No apparent distress. Awake, alert, and fully oriented.  HEENT: Normocephalic, atraumatic. Extraocular movements intact.  CARDIOVASCULAR: Regular rate and rhythm without murmurs or rubs. No S3.  PULMONARY: Clear to auscultation bilaterally.  ABDOMINAL: Soft, non-tender, non-distended. Bowel sounds normoactive.   EXTREMITIES: No cyanosis or clubbing. No appreciable edema.  NEUROLOGICAL: CN 2-12 grossly intact, no focal neurological deficits.  DERMATOLOGICAL: No rash, ulcer, bruising, nor jaundice.          Data:   EKG:  Personally reviewed.   Rate 60 bpm. PA interval 200. QRS duration 172. QT/QTc 498/498. P-R-T axis 46 1 193. Normal sinus rhythm. Left bundle branch block (new compared to EKG dated 3/25/14)    Laboratory:    Recent Labs  Lab 10/12/18  1442   WBC 6.6   HGB 8.9*   HCT 28.2*   MCV 92          Recent Labs  Lab 10/12/18  1442      POTASSIUM 4.9   CHLORIDE 111*   CO2 20   ANIONGAP 10   *   BUN 69*   CR 4.27*   GFRESTIMATED 14*   GFRESTBLACK 17*   SERGIO 7.0*       Recent Labs  Lab 10/12/18  1442   COLOR Yellow   APPEARANCE Cloudy   URINEGLC 150*   URINEBILI Negative   URINEKETONE Negative   SG 1.013   UBLD Small*   URINEPH 5.0   PROTEIN 100*   NITRITE Negative   LEUKEST Large*   RBCU 75*   WBCU >182*     No results for input(s): CULT in the last 168 hours.    Imaging:  No results found for this or any previous visit (from the past 24 hour(s)).    Elian Brandt DO MPH  [facility] Hospitalist  [address] [place] [address]  Pager: [phone]  10/12/2018

## 2018-10-12 NOTE — ED PROVIDER NOTES
History     Chief Complaint:  Abnormal labs    HPI   Brady Oviedo is a 73 year old male with a history of diabetes, HTN, hyperlipidemia who presents with abnormal labs. The patient just started seeing a new PCP and was called today by his PCP office telling him his kidney function and PSA levels are high and he should come to the ED (see note below). The patient denies any physical symptoms other than more frequent urination recently.       Basic Metabolic Panel,Xafmvxk00/10/2018  [facility]  Component Name Value Ref Range   Sodium 141 136 - 145 mmol/L   Potassium 4.7 3.5 - 5.1 mmol/L   Chloride 112 (H) 98 - 109 mmol/L   CO2 20 20 - 29 mmol/L   Anion Gap (calc.) 9 7 - 16 mmol/L   Glucose 119 (H) 70 - 100 mg/dl   Hours Fasting 12 hours    Calcium 7.8 (L) 8.4 - 10.4 mg/dl   BUN 55 (H) 7 - 26 mg/dl   Creatinine 4.32 (H) 0.73 - 1.18 mg/dl   GFR, Estimated 13 (L) >60 ml/min/1.73m2    GFR, Est., If Black 15 (L) >60 ml/min/1.73m2      Allergies:  No known allergies     Medications:    Norvasc  Aspirin  Hygroton  Catapres  Iron  Lantus  Synthroid  Toprol  Zocor     Past Medical History:    Atrial fibrillation  AV block, Mobitz 2  Type 2 diabetes  Diabetic neuropathy  HTN  Hyperlipidemia  Foot ulcer  Atrial fibrillation  CKD, Stage III    Past Surgical History:    Amputate toes  I&D  Internal pacemaker    Family History:    Cancer  Diabetes  MI    Social History:  Patient presents with wife  Negative for tobacco use.  Positive for alcohol use.   Marital Status:       Review of Systems   Genitourinary: Positive for frequency. Negative for difficulty urinating, dysuria and hematuria.   All other systems reviewed and are negative.      Physical Exam   First Vitals:  BP: (!) 160/98  Heart Rate: 75  Temp: 98  F (36.7  C)  Resp: 16  Weight: 80 kg (176 lb 5.9 oz)  SpO2: 98 %      Physical Exam  Constitutional:  Appears well-developed and well-nourished. Alert. Conversant. Non toxic.  HENT:   Head: Atraumatic.    Nose: Nose normal.  Mouth/Throat: Oral mucosa is clear and moist. no trismus. Pharynx normal. Tonsils symmetric. No tonsillar enlargement, erythema, or exudate.  Eyes: Conjunctivae normal. EOM normal. Pupils equal, round, and reactive to light. No scleral icterus.   Neck: Normal range of motion. Neck supple. No tracheal deviation present.   Cardiovascular: Normal rate, regular rhythm. No gallop. No friction rub. No murmur heard. Symmetric radial artery pulses   Pulmonary/Chest: Effort normal. No stridor. No respiratory distress. No wheezes. No rales. No rhonchi . No tenderness.   Abdominal: Soft. Bowel sounds normal. No distension. No mass. No tenderness. No rebound. No guarding.   Musculoskeletal:   RUE: Normal range of motion. No tenderness. No deformity  LUE: Normal range of motion. No tenderness. No deformity  RLE: Normal range of motion. No edema. No tenderness. No deformity  LLE: Normal range of motion. No edema. No tenderness. No deformity  Lymph: No cervical adenopathy.   Neurological: Alert and oriented to person, place, and time. Normal strength. CN II-VII intact. No sensory deficit. GCS eye subscore is 4. GCS verbal subscore is 5. GCS motor subscore is 6. Normal coordination   Skin: Skin is warm and dry. No rash noted. No pallor. Normal capillary refill.  Psychiatric:  Normal mood. Normal affect. polite    Emergency Department Course   ECG:  Time: 1458  Vent. Rate 60 bpm. OR interval 200. QRS duration 172. QT/QTc 498/498. P-R-T axis 46 1 193. Normal sinus rhythm. Left bundle branch block (new compared to EKG dated 3/25/14) Read time: 1502      Laboratory:  CBC: WBC: 6.6., HGB: 8.9 (L), PLT: 228  BMP: Glucose 176 (H), Chloride: 111 (H), BUN: 69 (H), Creatinine: 4.27 (H), GFR: 14 (L), Calcium: 7.0 (L), o/w WNL  UA with micro: Glucose: 150, Blood: small, Protein albumin: 100, Leukocyte esterase: large, WBC: >182 (H), RBC: 75 (H), WBC clumps: present, bacteria: few o/w negative  Urine culture:  pending    Interventions:  1452 - NS 1L IV  pending - Rocephin 1 g IV    Emergency Department Course:  Nursing notes and vitals reviewed.  1411: I performed an exam of the patient as documented above. IV inserted and blood drawn.  Labs ordered.    1542: I discussed the case with Dr. Jose, the patient's PCP, regarding the patient.    1558: Consult with Dr. Veras of Nephrology.    1625: I discussed the treatment plan with the patient. They expressed understanding of this plan and consented to admission. I discussed the patient with Dr. Brandt, who will admit the patient to a monitored bed for further evaluation and treatment.    The patient will be admitted to Dr. Brandt.     Impression & Plan    Medical Decision Making:  Brady Oviedo is a 73 year old male sent to the ER today from his primary care clinic for abnormal kidney function labs.  He had his first visit to establish a new primary care provider at [facility] on Wednesday.  Labs came back today showing BUN of 55 and creatinine of 4.32.  No old labs were available to his primary.  In our records we see that he has not had any kidney function tests for a year and a half, but baseline BUN was in the 40s and creatinine about 2.5.  Unclear whether this is a slow progression of underlying kidney disease or an acute on chronic renal failure.  Fortunately at this point no signs of acute fluid overload, pulmonary edema, hyperkalemia, or other life-threatening electrolyte disturbance.     Cause for his renal insufficiency is not clear.  He does have diabetes and hypertension but it sounds like those have been pretty well controlled, per the patient.  Baseline hemoglobin A1c is 6.9.  He does have reports of nocturia and has a markedly elevated PSA.  Concern is for possible BPH or prostate cancer causing postrenal obstructive uropathy.  Discussed with the patient.  He would prefer to avoid Hannah catheter if possible so one was not placed here in the  ER.  Patient is he dynamically stable, but may be mildly dehydrated but based on exam so administered IV fluid in case this is prerenal.  Discussed with nephrology, initially they were equivocal as to the need for admission since we did not know the chronicity of this renal failure.  However without proven chronic renal failure risk of progressive worsening over the weekend, with the earliest available appointment being the middle of next week, I feel that admission for more close monitoring is warranted.  Patient will be admitted for further kidney function workup.    Patient also has obvious urinary tract infection on urinalysis.  Suspect this is likely a consultation of his bladder outlet problem.  IV Rocephin administered.  Culture pending.  No flank pain, fever, vomiting, she has other symptoms of pyelonephritis or urosepsis.    Diagnosis:    ICD-10-CM    1. Renal failure, unspecified chronicity N19    2. Urinary tract infection without hematuria, site unspecified N39.0    3. Anemia, unspecified type D64.9      I, Shin Gallegos, am serving as a scribe on 10/12/2018 at 2:11 PM to personally document services performed by Austin Iniguez MD based on my observations and the provider's statements to me.       Shin Gallegos  10/12/2018   [facility] EMERGENCY DEPARTMENT       Austin Iniguez MD  10/12/18 1702       Austin Iniguez MD  10/12/18 3436

## 2018-10-13 LAB
ANION GAP SERPL CALCULATED.3IONS-SCNC: 8 MMOL/L (ref 3–14)
BUN SERPL-MCNC: 66 MG/DL (ref 7–30)
CALCIUM SERPL-MCNC: 6.6 MG/DL (ref 8.5–10.1)
CHLORIDE SERPL-SCNC: 117 MMOL/L (ref 94–109)
CHOLEST SERPL-MCNC: 99 MG/DL
CO2 SERPL-SCNC: 18 MMOL/L (ref 20–32)
CREAT SERPL-MCNC: 4.41 MG/DL (ref 0.66–1.25)
ERYTHROCYTE [DISTWIDTH] IN BLOOD BY AUTOMATED COUNT: 13.7 % (ref 10–15)
FERRITIN SERPL-MCNC: 211 NG/ML (ref 26–388)
FOLATE SERPL-MCNC: 10.3 NG/ML
GFR SERPL CREATININE-BSD FRML MDRD: 13 ML/MIN/1.7M2
GLUCOSE BLDC GLUCOMTR-MCNC: 101 MG/DL (ref 70–99)
GLUCOSE BLDC GLUCOMTR-MCNC: 115 MG/DL (ref 70–99)
GLUCOSE BLDC GLUCOMTR-MCNC: 134 MG/DL (ref 70–99)
GLUCOSE BLDC GLUCOMTR-MCNC: 255 MG/DL (ref 70–99)
GLUCOSE BLDC GLUCOMTR-MCNC: 54 MG/DL (ref 70–99)
GLUCOSE BLDC GLUCOMTR-MCNC: 72 MG/DL (ref 70–99)
GLUCOSE BLDC GLUCOMTR-MCNC: 98 MG/DL (ref 70–99)
GLUCOSE SERPL-MCNC: 62 MG/DL (ref 70–99)
HCT VFR BLD AUTO: 23.3 % (ref 40–53)
HDLC SERPL-MCNC: 29 MG/DL
HGB BLD-MCNC: 7.5 G/DL (ref 13.3–17.7)
IRON SATN MFR SERPL: 22 % (ref 15–46)
IRON SERPL-MCNC: 36 UG/DL (ref 35–180)
LDH SERPL L TO P-CCNC: 174 U/L (ref 85–227)
LDLC SERPL CALC-MCNC: 48 MG/DL
MCH RBC QN AUTO: 29.6 PG (ref 26.5–33)
MCHC RBC AUTO-ENTMCNC: 32.2 G/DL (ref 31.5–36.5)
MCV RBC AUTO: 92 FL (ref 78–100)
NONHDLC SERPL-MCNC: 70 MG/DL
PLATELET # BLD AUTO: 184 10E9/L (ref 150–450)
POTASSIUM SERPL-SCNC: 4.7 MMOL/L (ref 3.4–5.3)
PTH-INTACT SERPL-MCNC: 360 PG/ML (ref 18–80)
RBC # BLD AUTO: 2.53 10E12/L (ref 4.4–5.9)
SODIUM SERPL-SCNC: 143 MMOL/L (ref 133–144)
TIBC SERPL-MCNC: 162 UG/DL (ref 240–430)
TRIGL SERPL-MCNC: 112 MG/DL
VIT B12 SERPL-MCNC: 430 PG/ML (ref 193–986)
WBC # BLD AUTO: 8 10E9/L (ref 4–11)

## 2018-10-13 PROCEDURE — 99233 SBSQ HOSP IP/OBS HIGH 50: CPT | Performed by: HOSPITALIST

## 2018-10-13 PROCEDURE — 85027 COMPLETE CBC AUTOMATED: CPT | Performed by: HOSPITALIST

## 2018-10-13 PROCEDURE — 80061 LIPID PANEL: CPT | Performed by: HOSPITALIST

## 2018-10-13 PROCEDURE — 25000132 ZZH RX MED GY IP 250 OP 250 PS 637: Performed by: HOSPITALIST

## 2018-10-13 PROCEDURE — 80048 BASIC METABOLIC PNL TOTAL CA: CPT | Performed by: HOSPITALIST

## 2018-10-13 PROCEDURE — 83970 ASSAY OF PARATHORMONE: CPT | Performed by: HOSPITALIST

## 2018-10-13 PROCEDURE — 82607 VITAMIN B-12: CPT | Performed by: HOSPITALIST

## 2018-10-13 PROCEDURE — 00000146 ZZHCL STATISTIC GLUCOSE BY METER IP

## 2018-10-13 PROCEDURE — 36415 COLL VENOUS BLD VENIPUNCTURE: CPT | Performed by: HOSPITALIST

## 2018-10-13 PROCEDURE — 82306 VITAMIN D 25 HYDROXY: CPT | Performed by: HOSPITALIST

## 2018-10-13 PROCEDURE — 83615 LACTATE (LD) (LDH) ENZYME: CPT | Performed by: HOSPITALIST

## 2018-10-13 PROCEDURE — 82746 ASSAY OF FOLIC ACID SERUM: CPT | Performed by: HOSPITALIST

## 2018-10-13 PROCEDURE — 83010 ASSAY OF HAPTOGLOBIN QUANT: CPT | Performed by: HOSPITALIST

## 2018-10-13 PROCEDURE — 82728 ASSAY OF FERRITIN: CPT | Performed by: HOSPITALIST

## 2018-10-13 PROCEDURE — 25000131 ZZH RX MED GY IP 250 OP 636 PS 637: Performed by: HOSPITALIST

## 2018-10-13 PROCEDURE — 83540 ASSAY OF IRON: CPT | Performed by: HOSPITALIST

## 2018-10-13 PROCEDURE — 83550 IRON BINDING TEST: CPT | Performed by: HOSPITALIST

## 2018-10-13 PROCEDURE — 12000000 ZZH R&B MED SURG/OB

## 2018-10-13 PROCEDURE — 25000128 H RX IP 250 OP 636: Performed by: HOSPITALIST

## 2018-10-13 RX ORDER — TAMSULOSIN HYDROCHLORIDE 0.4 MG/1
0.4 CAPSULE ORAL DAILY
Status: DISCONTINUED | OUTPATIENT
Start: 2018-10-13 | End: 2018-10-15 | Stop reason: HOSPADM

## 2018-10-13 RX ADMIN — SIMVASTATIN 10 MG: 10 TABLET, FILM COATED ORAL at 21:17

## 2018-10-13 RX ADMIN — CLONIDINE HYDROCHLORIDE 0.1 MG: 0.1 TABLET ORAL at 08:15

## 2018-10-13 RX ADMIN — SODIUM CHLORIDE: 9 INJECTION, SOLUTION INTRAVENOUS at 03:34

## 2018-10-13 RX ADMIN — INSULIN GLARGINE 25 UNITS: 100 INJECTION, SOLUTION SUBCUTANEOUS at 21:17

## 2018-10-13 RX ADMIN — INSULIN ASPART 3 UNITS: 100 INJECTION, SOLUTION INTRAVENOUS; SUBCUTANEOUS at 14:05

## 2018-10-13 RX ADMIN — CEFTRIAXONE SODIUM 1 G: 1 INJECTION, POWDER, FOR SOLUTION INTRAMUSCULAR; INTRAVENOUS at 15:51

## 2018-10-13 RX ADMIN — AMLODIPINE BESYLATE 10 MG: 10 TABLET ORAL at 08:15

## 2018-10-13 RX ADMIN — LEVOTHYROXINE SODIUM 50 MCG: 50 TABLET ORAL at 08:15

## 2018-10-13 RX ADMIN — TAMSULOSIN HYDROCHLORIDE 0.4 MG: 0.4 CAPSULE ORAL at 10:28

## 2018-10-13 RX ADMIN — METOPROLOL SUCCINATE 100 MG: 100 TABLET, EXTENDED RELEASE ORAL at 08:15

## 2018-10-13 RX ADMIN — ASPIRIN 81 MG 81 MG: 81 TABLET ORAL at 08:15

## 2018-10-13 ASSESSMENT — ACTIVITIES OF DAILY LIVING (ADL)
ADLS_ACUITY_SCORE: 9

## 2018-10-13 NOTE — PLAN OF CARE
Problem: Patient Care Overview  Goal: Plan of Care/Patient Progress Review  Outcome: No Change  Ambulatory Status:  Pt up independent.  VS:  Vital signs:  Temp: 97.4  F (36.3  C) Temp src: Oral BP: 127/57   Heart Rate: 68 Resp: 16 SpO2: 97 % O2 Device: None (Room air)  Pain:  none  Resp: LS clear  GI:  no nausea.  fair appetite and on regular diet.  BS active.  Passing flatus.  :  Voiding well, clouding urine noted  Skin:  normal  Tx:  Saline locked IV, IV rocefin, and treatment of low BS  Labs:  BS 62, 54, and 255. Creatine 4.41  Consults:  nephrology  Disposition:  Home TBD

## 2018-10-13 NOTE — PLAN OF CARE
Problem: Patient Care Overview  Goal: Plan of Care/Patient Progress Review  Outcome: No Change  Pt remains hospitalized for NANDA and UTI.   Vital signs stable, on RA, denies any pain.  BG 72 apple juice and agnes crackers given, recheck 134.  Up independently, voiding adequate amounts.   PIV /hr, continues IV Rocephin.   Consults: Nephrology  Will continue to monitor.

## 2018-10-13 NOTE — PLAN OF CARE
Problem: Patient Care Overview  Goal: Plan of Care/Patient Progress Review  Outcome: Improving  Urine sent to lab, cloudy yellow. Continues on iv fluids and iv rocephin. Blood glu wdl, given lantus insulin per orders. Ate well. Wife visiting and supportive. Alert and oriented and up independently. To radiology for renal US. Vss, afebrile, bp 166/85.

## 2018-10-13 NOTE — CONSULTS
See dictation.    Advancing renal failure probably due to worsening diabetic nephrosclerosis,  There may be a an acute potentially reversible component to his current renal dysfunction, due to UTI.  Watch chemistries as he receives antibiotics. No sign of obstructive uropathy by ultrasound, despite enlarged prostate and PSA of 38.2 at [facility] this week.    No change in meds yet.  Consider adding a loop diuretic in place of chlorthalidone before discharge.  Avoid TORSTEN blockers, even though albuminuria has increased in the nephrotic range since 2017.      Pt requests referral to [facility] for out-pt Nephrology and Urology care at time of discharge.    Thanks.    Adam Valerio MD  Nephrology; [facility]  [phone]

## 2018-10-13 NOTE — PROGRESS NOTES
[facility]    Hospitalist Progress Note  Name: Brady Oviedo    MRN: [id number]  Provider:  Elian Brandt DO, MPH  Date of Service: 10/13/2018    Summary of Stay: Brady Oviedo is a 73 year old male with a history of atrial fibrillation and AV block Mobitz type II status post pacemaker implantation, type 2 diabetes mellitus, diabetic neuropathy, hypertension, hyperlipidemia, chronic kidney disease who vented 10/12 with abnormal labs.     Problem List:  1.  Acute versus chronic renal failure with nephrotic range proteinuria: Creatinine in March 2017 was 2.6.  Currently it is 4.3.  I have no values in between that time.  I suspect this is probably progression of chronic renal failure given the failure to improve overnight on IV fluids.  Electrolytes and volume status are reasonable.  He is only mildly acidotic this morning but I suspect that is more related to the normal saline.  Nothing to suggest uremic symptoms.  High phosphorus level also supportive of morbid chronic progressive kidney disease.  FENa is not suggestive of prerenal etiology.  Renal ultrasound shows no abnormalities.  Provide normal saline overnight to see if there is any reversible component to his renal failure.  To note he does endorse urinary frequency in the context of a rising PSA so he could have some obstructive nephropathy.  He has a history of nephrotic range proteinuria and that continues on recheck this admission.  He is not edematous and otherwise shows no clinical signs of nephrotic syndrome.  Lipid panel fairly unremarkable in the context of evaluating for nephrotic syndrome.  Will request nephrology consultation to guide further workup and arrange outpatient follow-up.     2.  Normocytic anemia: Again he has had about a 2 g drop in his hemoglobin over the last 1.5 years.  He denies any bleeding.  I suspect this is anemia of chronic kidney disease.  Will check iron studies, B12/folate, LDH, haptoglobin, reticulocyte  count, and blood smear for the sake of completion.  He has no symptoms associated with his anemia and vital signs are stable so no transfusion is warranted.  Hemoglobin did drop overnight but I suspect this is hemodilution.  Defer to nephrology if iron infusion or if Epogen is required.     3.  Urinary tract infection: Grossly abnormal urinalysis in the ED.  Start ceftriaxone and follow-up urine culture.     4.  Hypertension: Blood pressure is reasonable at this time but slightly elevated.  Will resume his amlodipine 10 mg daily, clonidine 0.1 mg daily (strage regimen), and metoprolol succinate 100 mg daily.  Hold his chlorthalidone in the context of renal dysfunction.  We will see what nephrology thinks about adjusting his antihypertensive regimen.     5.  Elevated PSA with urinary frequency: Differential diagnosis would include BPH versus prostate cancer.  He clearly needs outpatient urology follow-up and likely prostate biopsy.  Ultrasound yesterday shows prostatic enlargement but no other specific abnormalities.     6.  Diabetes mellitus: Current blood sugars reasonable.  Continue his Lantus 15 units at bedtime as well as a medium sliding scale insulin.  One low blood sugar overnight but quite mild.     7.  History of atrial fibrillation and Mobitz type II AV block and sick sinus syndrome status post pacemaker implantation: Followed by cardiology.  Appears as though his device is functioning properly.  We will continue his prior to admission metoprolol.  No further changes.     8.  Hypothyroidism: TSH normal and continue his levothyroxine 50 mcg daily.    DVT Prophylaxis: Pneumatic Compression Devices  Code Status: Full Code  Disposition: Expected discharge in 1-2 days to home. Goals prior to discharge include nephrology evaluation.   Incidental Findings: None.  Family updated today: Yes      Interval History   Assumed care from previous hospitalist. The history was fully reviewed.  The patient reports doing  well. No chest pain or shortness of breath. No nausea, vomiting, diarrhea, constipation. No fevers. No other specific complaints identified.     -Data reviewed today: I personally reviewed all new labs and imaging results over the last 24 hours.     Physical Exam   Temp: 97.1  F (36.2  C) Temp src: Oral BP: 142/63   Heart Rate: 68 Resp: 16 SpO2: 97 % O2 Device: None (Room air)    Vitals:    10/12/18 1342 10/12/18 1802 10/13/18 0523   Weight: 80 kg (176 lb 5.9 oz) 78.2 kg (172 lb 8 oz) 78.9 kg (173 lb 14.4 oz)     Vital Signs with Ranges  Temp:  [95.7  F (35.4  C)-98  F (36.7  C)] 97.1  F (36.2  C)  Heart Rate:  [60-75] 68  Resp:  [16] 16  BP: (101-169)/(57-98) 142/63  SpO2:  [95 %-99 %] 97 %  I/O last 3 completed shifts:  In: 1328 [P.O.:240; I.V.:1088]  Out: 1400 [Urine:1400]    GENERAL: No apparent distress. Awake, alert, and fully oriented.  HEENT: Normocephalic, atraumatic. Extraocular movements intact.  CARDIOVASCULAR: Regular rate and rhythm without murmurs or rubs. No S3.  PULMONARY: Clear bilaterally.  GASTROINTESTINAL: Soft, non-tender, non-distended. Bowel sounds normoactive.   EXTREMITIES: No cyanosis or clubbing. No edema.  NEUROLOGICAL: CN 2-12 grossly intact, no focal neurological deficits.  DERMATOLOGICAL: No rash, ulcer, bruising, nor jaundice.     Medications       amLODIPine  10 mg Oral QAM     aspirin  81 mg Oral Daily     cefTRIAXone  1 g Intravenous Q24H     cloNIDine  0.1 mg Oral Daily     insulin aspart  1-7 Units Subcutaneous TID AC     insulin aspart  1-5 Units Subcutaneous At Bedtime     insulin glargine  25 Units Subcutaneous At Bedtime     levothyroxine  50 mcg Oral Daily     metoprolol succinate  100 mg Oral Daily     simvastatin  10 mg Oral At Bedtime     tamsulosin  0.4 mg Oral Daily     Data     Laboratory:    Recent Labs  Lab 10/13/18  0654 10/12/18  1442   WBC 8.0 6.6   HGB 7.5* 8.9*   HCT 23.3* 28.2*   MCV 92 92    228       Recent Labs  Lab 10/13/18  0654 10/12/18  1442   NA  143 141   POTASSIUM 4.7 4.9   CHLORIDE 117* 111*   CO2 18* 20   ANIONGAP 8 10   GLC 62* 176*   BUN 66* 69*   CR 4.41* 4.27*   GFRESTIMATED 13* 14*   GFRESTBLACK 16* 17*   SERGIO 6.6* 7.0*       Recent Labs  Lab 10/12/18  1441   CULT PENDING       Imaging:  Recent Results (from the past 24 hour(s))   US Renal Complete    Narrative    COMPLETE RENAL ULTRASOUND   10/12/2018 7:46 PM    HISTORY: Acute renal failure.    COMPARISON: None.    FINDINGS: The right kidney measures 11.3 x 5.5 x 6.2 cm and the left  measures 11.0 x 4.7 x 5.2 cm. Renal cortical thickness is 1.4 cm on  the right and 1.3 cm on the left. The kidneys demonstrate normal  echogenicity with no calculus or abnormal mass. No hydronephrosis is  seen. The bladder is partly distended. No bladder wall thickening or  abnormal mass is seen. There appears to be a small amount of debris  within the bladder. An enlarged prostate gland is demonstrated.      Impression    IMPRESSION: Prostatic enlargement. There may be a small amount of  debris within the bladder. This is of uncertain etiology. No focal  mass is seen.     MD Elian LAZCANO DO MPH  [facility] Hospitalist  [address] [place] [address]  Pager: [phone]  10/13/2018

## 2018-10-14 LAB
ANION GAP SERPL CALCULATED.3IONS-SCNC: 9 MMOL/L (ref 3–14)
BACTERIA SPEC CULT: ABNORMAL
BUN SERPL-MCNC: 64 MG/DL (ref 7–30)
CALCIUM SERPL-MCNC: 7 MG/DL (ref 8.5–10.1)
CHLORIDE SERPL-SCNC: 113 MMOL/L (ref 94–109)
CO2 SERPL-SCNC: 18 MMOL/L (ref 20–32)
CREAT SERPL-MCNC: 4.5 MG/DL (ref 0.66–1.25)
GFR SERPL CREATININE-BSD FRML MDRD: 13 ML/MIN/1.7M2
GLUCOSE BLDC GLUCOMTR-MCNC: 125 MG/DL (ref 70–99)
GLUCOSE BLDC GLUCOMTR-MCNC: 154 MG/DL (ref 70–99)
GLUCOSE BLDC GLUCOMTR-MCNC: 69 MG/DL (ref 70–99)
GLUCOSE BLDC GLUCOMTR-MCNC: 73 MG/DL (ref 70–99)
GLUCOSE BLDC GLUCOMTR-MCNC: 97 MG/DL (ref 70–99)
GLUCOSE SERPL-MCNC: 84 MG/DL (ref 70–99)
HGB BLD-MCNC: 8.4 G/DL (ref 13.3–17.7)
INTERPRETATION ECG - MUSE: NORMAL
Lab: ABNORMAL
PHOSPHATE SERPL-MCNC: 5.2 MG/DL (ref 2.5–4.5)
POTASSIUM SERPL-SCNC: 4.7 MMOL/L (ref 3.4–5.3)
SODIUM SERPL-SCNC: 140 MMOL/L (ref 133–144)
SPECIMEN SOURCE: ABNORMAL

## 2018-10-14 PROCEDURE — 25000128 H RX IP 250 OP 636: Performed by: HOSPITALIST

## 2018-10-14 PROCEDURE — 85018 HEMOGLOBIN: CPT | Performed by: INTERNAL MEDICINE

## 2018-10-14 PROCEDURE — 12000000 ZZH R&B MED SURG/OB

## 2018-10-14 PROCEDURE — 80048 BASIC METABOLIC PNL TOTAL CA: CPT | Performed by: INTERNAL MEDICINE

## 2018-10-14 PROCEDURE — 25000131 ZZH RX MED GY IP 250 OP 636 PS 637: Performed by: HOSPITALIST

## 2018-10-14 PROCEDURE — 25000132 ZZH RX MED GY IP 250 OP 250 PS 637: Performed by: INTERNAL MEDICINE

## 2018-10-14 PROCEDURE — 99232 SBSQ HOSP IP/OBS MODERATE 35: CPT | Performed by: INTERNAL MEDICINE

## 2018-10-14 PROCEDURE — 84100 ASSAY OF PHOSPHORUS: CPT | Performed by: INTERNAL MEDICINE

## 2018-10-14 PROCEDURE — 99207 ZZC CDG-MDM COMPONENT: MEETS LOW - DOWN CODED: CPT | Performed by: INTERNAL MEDICINE

## 2018-10-14 PROCEDURE — 25000132 ZZH RX MED GY IP 250 OP 250 PS 637: Performed by: HOSPITALIST

## 2018-10-14 PROCEDURE — 00000146 ZZHCL STATISTIC GLUCOSE BY METER IP

## 2018-10-14 PROCEDURE — 36415 COLL VENOUS BLD VENIPUNCTURE: CPT | Performed by: INTERNAL MEDICINE

## 2018-10-14 RX ORDER — SODIUM BICARBONATE 650 MG/1
1300 TABLET ORAL 2 TIMES DAILY
Status: DISCONTINUED | OUTPATIENT
Start: 2018-10-14 | End: 2018-10-15 | Stop reason: HOSPADM

## 2018-10-14 RX ORDER — FUROSEMIDE 20 MG
20 TABLET ORAL DAILY
Status: DISCONTINUED | OUTPATIENT
Start: 2018-10-14 | End: 2018-10-15 | Stop reason: HOSPADM

## 2018-10-14 RX ADMIN — INSULIN GLARGINE 25 UNITS: 100 INJECTION, SOLUTION SUBCUTANEOUS at 21:44

## 2018-10-14 RX ADMIN — METOPROLOL SUCCINATE 100 MG: 100 TABLET, EXTENDED RELEASE ORAL at 07:48

## 2018-10-14 RX ADMIN — TAMSULOSIN HYDROCHLORIDE 0.4 MG: 0.4 CAPSULE ORAL at 07:49

## 2018-10-14 RX ADMIN — SIMVASTATIN 10 MG: 10 TABLET, FILM COATED ORAL at 21:42

## 2018-10-14 RX ADMIN — CLONIDINE HYDROCHLORIDE 0.1 MG: 0.1 TABLET ORAL at 07:49

## 2018-10-14 RX ADMIN — FUROSEMIDE 20 MG: 20 TABLET ORAL at 17:00

## 2018-10-14 RX ADMIN — LEVOTHYROXINE SODIUM 50 MCG: 50 TABLET ORAL at 07:49

## 2018-10-14 RX ADMIN — AMLODIPINE BESYLATE 10 MG: 10 TABLET ORAL at 07:49

## 2018-10-14 RX ADMIN — SODIUM BICARBONATE 650 MG TABLET 1300 MG: at 21:42

## 2018-10-14 RX ADMIN — ASPIRIN 81 MG 81 MG: 81 TABLET ORAL at 07:48

## 2018-10-14 RX ADMIN — CEFTRIAXONE SODIUM 1 G: 1 INJECTION, POWDER, FOR SOLUTION INTRAMUSCULAR; INTRAVENOUS at 17:00

## 2018-10-14 ASSESSMENT — ACTIVITIES OF DAILY LIVING (ADL)
ADLS_ACUITY_SCORE: 9

## 2018-10-14 NOTE — CONSULTS
Consult Date:  10/13/2018      NEPHROLOGY CONSULT      REQUESTING PHYSICIAN:  Elian Brandt MD       PRIMARY CARE PHYSICIAN:  Dr. Jose with [facility] in [place].      HISTORY OF PRESENT ILLNESS:  Brady Oviedo is a 73-year-old man whom we are asked to see after admission yesterday for evaluation of advancing kidney disease.  Mr. Oviedo established a new primary care last week with Dr. Jose, and labs obtained on 10/10 showed stage V chronic kidney disease with a BUN and creatinine of 55 and 4.32.  Estimated GFR is 13.  Electrolytes showed a mild non-anion gap metabolic acidosis, but were otherwise normal.  The patient had some dysuria of about 2 weeks' duration when he presented to the hospital yesterday.  His urine was not examined for infection when he was at the [facility] 3 days ago.  He does have evidence of a probably lower urinary tract infection at this time.  His urinalysis yesterday showed innumerable white cells and a generous number of red cells as well as bacteria in the sediment.  The patient's dipstick was positive for glucose, protein, blood, and leukocyte esterase.  The culture shows growth between 50 and 100,000 colonies of Klebsiella.  The sensitivity data are pending.  The patient has been begun on ceftriaxone 1 gram daily beginning yesterday, and he has some improvement in his symptoms today.  Mr. Oviedo has no other complaints; in particular, he denies any abdominal discomfort or GI symptoms.  He feels that his appetite is good and that he has not been deliberately trying to lose weight.  He denies any chest discomfort or respiratory problems.      Mr. Oviedo has a lengthy record of care in the [facility] system.  He has been followed in the past at the [facility], mainly by physician assistant, Angeles Friedman.  She last saw Mr. Oviedo in 03/2017.  He is also followed by the [facility] because of presumed atherosclerotic heart  disease, which led to high grade AV block and required placement of a permanent pacemaker in 2014.   Dr. Jaime has followed him for ongoing cardiology care.  His last cardiology clinic appointment was in June of last year.  At that time, pacemaker was doing well and a 2-year followup was recommended.  Dr. Jaime mentions that there has also been evidence of intermittent paroxysmal atrial fibrillation, though episodes have been brief.  The patient has been unwilling to consider the addition of anticoagulant therapy for stroke protection with the atrial fibrillation.      Mr. Oviedo's other medical problems include hypertension, hyperlipidemia, hypothyroidism, and type 2 diabetes.  His diabetes is of greater than 20 years' duration and he is on insulin exclusively at this time.  Previous therapy with metformin has been abandoned because of renal abnormalities.  He has chronic kidney disease that was first documented in about 2011.  At that time, creatinine was between 1 and 1.1.  There has been a slow, but steady progression of renal failure since that time.  By 2014, baseline creatinine appeared to be above 1.5 and by 2016 creatinine was above 2.  When he was seen most recently by Ms. Friedman 18 months ago, creatinine was up to 2.57.  His electrolytes through last year have been in the normal range and his BUN between 2015 and last year was about 40.  Mr. Oviedo also has had gradually worsening albuminuria that was first detected in 2011.  It was between 100 and 200 initially.  By 2014 it was greater than 1000 and in 2015 about the same.  Last year in March albuminuria was about 4.5 g/g of creatinine.  Earlier this week in the [facility], albuminuria was greater than 6 g/g creatinine.  Serum albumin has gradually fallen.  Currently it is 3.  It was 3.4 four years ago, but has not yet been checked since that time.  Recent lipids and hemoglobin A1c have been satisfactory.  The patient has been on an  antihypertensive combination of amlodipine, clonidine, metoprolol, and chlorthalidone.  His chlorthalidone has been withheld, but others continued during this hospitalization.  Lipid management is with a statin and he has been on a stable dose of replacement for his hypothyroidism.  TSH is in the therapeutic range.      Mr. Oviedo's vital signs on admission showed a mild increase in blood pressure, but otherwise were stable.  He had good room air oxygenation and no fever.  Vital signs today show an improved blood pressure and otherwise no change.  He has had a good urine output.  He did receive IV fluids yesterday, but these have been discontinued as his oral intake has been good.  He has been placed on an unrestricted diet at this time and has eaten well.      PHYSICAL EXAMINATION:   GENERAL:  Mr. Oviedo is an alert, calm man in no distress.  He does look chronically ill.  He has lost a significant amount of weight in the last 18 months.   VITAL SIGNS:  Current weight is about 79 kilograms, more than 10 kilograms less than he was when he saw Ms. Friedman 18 months ago.  His weight was around 90 kg for much of the last few years.  He does not to look severly malnourished at this time.  Temperature is 97.4.  Pulse is regular at 68.  Respirations are 16 and unlabored.  Oxygen saturation on room air is 97%.  Blood pressure 127/57.   SKIN:  Showed satisfactory color and turgor.  There are no suspicious skin lesions.   HEENT/NECK:  Mucous membranes are moist.  Exams are otherwise unremarkable.  There is no JVD.   LUNGS:  Clear.   CARDIOVASCULAR:  Seems normal without murmur, rub, or gallop.  Peripheral pulses are satisfactory throughout.   ABDOMEN:  Soft, nontender.  Without masses, organomegaly, or bruits.  Bowel sounds are of normal quality.   EXTREMITIES:  Warm.  There is no edema.   NEUROLOGIC:  Brief neurologic exam is negative.      LABORATORY DATA:  Subsequent labs here include a bilateral renal ultrasound which  shows no kidney abnormalities and no sign of obstructive uropathy.  The bladder is not well visualized, though comment is made that there may be some nonspecific debris within the bladder.  The prostate is notably enlarged in what seems to be a symmetric pattern.  The bladder wall does not appear to be thickened.  Blood chemistries here show findings similar to those from the [facility] 3 days ago.  Today's labs are similar to yesterday's.  Electrolytes once again show a mild normal anion gap metabolic acidosis, but otherwise are normal.  BUN and creatinine today are 66 and 4.4.  Calcium level is low at 6.6.  Yesterday's value was 7.0 and 3 days ago in the outpatient setting calcium was 7.8.  Today a phosphorus level is 5.4.  Liver function tests are normal aside from the above-mentioned albumin of 3.  Urine protein is similar to that obtained in the [facility], greater than 5 g/g creatinine.  Hemoglobin initially 8.9, has fallen with IV fluids to 7.5.  Iron levels are satisfactory as are vitamin B12 and folic acid levels.  The rest of the CBC is normal.  Mr. Oviedo has evidence of chronic anemia, though of a lesser severity.  Hemoglobin in 2017 was 10.9 and it has been between 9.5 and 11 for the last several years.  He has not had evaluation of folate and B12 levels before, but iron levels have been satisfactory in the past.      ASSESSMENT:  Mr. Oviedo's advancing chronic kidney disease is almost certainly in large part related to what appears to be a long history of slowly evolving diabetic nephrosclerosis.  There may be an acute, potentially reversible component to his current renal abnormality related to his UTI.  It is noteworthy, however, that there is no sign of obstructive uropathy by the ultrasound exam of yesterday, even though prostate is enlarged and there is clear evidence of a prostate abnormality that needs further urologic evaluation.  His PSA at the [facility]  this week was 38.2.  We will see if renal function improves as antibiotic therapy is given.  There has been no significant improvement in blood chemistries with overnight IV fluids.      I will not make any changes in his medications at this point.  I agree with discontinuation of chlorthalidone.  It would be appropriate to add a loop diuretic at some point before discharge.  I would avoid all TORSTEN blockers, even though his albuminuria has continued to increase in the nephrotic range.  At this point Mr. Boyce is not in need of urgent dialysis, but if there is no improvement with antibiotic therapy, the trajectory of his worsening chronic kidney disease is such that dialysis is likely to be needed within the next several months or at most about a year.  I have spoken with Mr. Boyce and a family member in the room about outpatient followup.  He has made a comfortable transition to primary care with the [facility] and at this point would like to proceed with specialty care in that system as well, both with Nephrology and also Urology.      Our group will follow during the remainder of his hospital stay and assist as needed with a referral for outpatient nephrology with [facility].      Thank you for the opportunity to assist in Mr. Boyce's care.  Our followup will be daily through his hospitalization.         SUSAN COLEMAN MD             D: 10/13/2018   T: 10/13/2018   MT: NTS      Name:     RAHEL BOYCE   MRN:      [id number]        Account:       [id number]   :      1945           Consult Date:  10/13/2018      Document: [id number]       cc: Negrito Jose MD

## 2018-10-14 NOTE — PROGRESS NOTES
[facility]  Hospitalist Progress Note  Name: Brady Oviedo    MRN: [id number]  Admit 10/12/2018  1:45 PM        Date of Service: 10/14/2018    Summary of Stay: Brady Oviedo is a 73 year old male with a history of atrial fibrillation and AV block Mobitz type II status post pacemaker implantation, type 2 diabetes mellitus, diabetic neuropathy, hypertension, hyperlipidemia, chronic kidney disease who vented 10/12 with abnormal labs.     Problem List:  1.  Acute versus chronic renal failure with nephrotic range proteinuria: Creatinine in March 2017 was 2.6.  Currently it is 4.3.  I have no values in between that time.  I suspect this is probably progression of chronic renal failure given the failure to improve overnight on IV fluids.  Electrolytes and volume status are reasonable.  He is only mildly acidotic this morning but I suspect that is more related to the normal saline.  Nothing to suggest uremic symptoms.  High phosphorus level also supportive of morbid chronic progressive kidney disease.  FENa is not suggestive of prerenal etiology.  Renal ultrasound shows no abnormalities.  Provide normal saline overnight to see if there is any reversible component to his renal failure.  To note he does endorse urinary frequency in the context of a rising PSA so he could have some obstructive nephropathy.  He has a history of nephrotic range proteinuria and that continues on recheck this admission.  He is not edematous and otherwise shows no clinical signs of nephrotic syndrome.  Lipid panel fairly unremarkable in the context of evaluating for nephrotic syndrome.    Needs follow-up with nephrology and urology as outpatient at [facility]    2.  Normocytic anemia: Again he has had about a 2 g drop in his hemoglobin over the last 1.5 years.  He denies any bleeding.  I suspect this is anemia of chronic kidney disease.  Will check iron studies, B12/folate, LDH, haptoglobin, reticulocyte count, and blood  smear for the sake of completion.  He has no symptoms associated with his anemia and vital signs are stable so no transfusion is warranted.  Hemoglobin did drop overnight but I suspect this is hemodilution.  Defer to outpatient nephrology if iron infusion or if Epogen is required.     3.  Urinary tract infection: Grossly abnormal urinalysis in the ED. continue Rocephin and adjust oral antibiotic with culture sensitivity    4.  Hypertension: Blood pressure is reasonable at this time but slightly elevated.  Will resume his amlodipine 10 mg daily, clonidine 0.1 mg daily , and metoprolol succinate 100 mg daily.  Hold his chlorthalidone in the context of renal dysfunction.  Nephrology did not recommend any ACE inhibitor or ARB     5.  Elevated PSA with urinary frequency: Differential diagnosis would include BPH versus prostate cancer.  He clearly needs outpatient urology follow-up and likely prostate biopsy.  Ultrasound shows prostatic enlargement but no other specific abnormalities.     6.  Diabetes mellitus: Current blood sugars reasonable.  Continue his Lantus 15 units at bedtime as well as a medium sliding scale insulin.       7.  History of atrial fibrillation and Mobitz type II AV block and sick sinus syndrome status post pacemaker implantation: Followed by cardiology.  Appears as though his device is functioning properly.  We will continue his prior to admission metoprolol.  No further changes.     8.  Hypothyroidism: TSH normal and continue his levothyroxine 50 mcg daily.    DVT Prophylaxis: Pneumatic Compression Devices  Code Status: Full Code  Disposition: Expected discharge tomorrow to home. If continues to improve, stable kidney function, on oral antibiotic for UTI    Family updated today: Yes , wife at the bedside     Interval History   Assumed care from previous hospitalist. The history was fully reviewed.  Patient denies any pain, he is afebrile, reports improvement in appetite, care discussed with his  nurse and his wife    Physical Exam   Temp: 97.4  F (36.3  C) Temp src: Oral BP: 131/53   Heart Rate: 70 Resp: 16 SpO2: 95 % O2 Device: None (Room air)    Vitals:    10/12/18 1802 10/13/18 0523 10/14/18 0824   Weight: 78.2 kg (172 lb 8 oz) 78.9 kg (173 lb 14.4 oz) 79.4 kg (175 lb)     Vital Signs with Ranges  Temp:  [97.2  F (36.2  C)-97.4  F (36.3  C)] 97.4  F (36.3  C)  Heart Rate:  [60-70] 70  Resp:  [16-18] 16  BP: (127-131)/(51-57) 131/53  SpO2:  [95 %-97 %] 95 %  I/O last 3 completed shifts:  In: 946 [P.O.:660; I.V.:286]  Out: 1450 [Urine:1450]    GENERAL: No apparent distress. Awake, alert, and fully oriented.  HEENT: Normocephalic, atraumatic. Extraocular movements intact.  CARDIOVASCULAR: Regular rate and rhythm without murmurs or rubs. No S3.  PULMONARY: Clear bilaterally.  GASTROINTESTINAL: Soft, non-tender, non-distended. Bowel sounds normoactive.   EXTREMITIES: No cyanosis or clubbing. No edema.  NEUROLOGICAL: CN 2-12 grossly intact, no focal neurological deficits.  DERMATOLOGICAL: No rash, ulcer, bruising, nor jaundice.     Medications       amLODIPine  10 mg Oral QAM     aspirin  81 mg Oral Daily     cefTRIAXone  1 g Intravenous Q24H     cloNIDine  0.1 mg Oral Daily     insulin aspart  1-7 Units Subcutaneous TID AC     insulin aspart  1-5 Units Subcutaneous At Bedtime     insulin glargine  25 Units Subcutaneous At Bedtime     levothyroxine  50 mcg Oral Daily     metoprolol succinate  100 mg Oral Daily     simvastatin  10 mg Oral At Bedtime     tamsulosin  0.4 mg Oral Daily     Data     Laboratory:    Recent Labs  Lab 10/14/18  0901 10/13/18  0654 10/12/18  1442   WBC  --  8.0 6.6   HGB 8.4* 7.5* 8.9*   HCT  --  23.3* 28.2*   MCV  --  92 92   PLT  --  184 228       Recent Labs  Lab 10/14/18  0901 10/13/18  0654 10/12/18  1442    143 141   POTASSIUM 4.7 4.7 4.9   CHLORIDE 113* 117* 111*   CO2 18* 18* 20   ANIONGAP 9 8 10   GLC 84 62* 176*   BUN 64* 66* 69*   CR 4.50* 4.41* 4.27*   GFRESTIMATED 13*  13* 14*   RACHEL 16* 16* 17*   SERGIO 7.0* 6.6* 7.0*       Recent Labs  Lab 10/12/18  1441   CULT 50,000 to 100,000 colonies/mLKlebsiella oxytoca*

## 2018-10-14 NOTE — PROGRESS NOTES
Nephrology Progress Note          Assessment and Plan:   Stage 5 CKD same.  Doubt any potentially reversible NANDA.  Wt stable and fluid balance looks ok.  No immediate need for dialysis support.  I will switch to renal diabetic diet.  He will need instruction with this diet before discharge.  Expect switch soon to oral antibiotic for uncomplicated UTI.  Add loop diuretic and supplemental NaHCO3 now.  Probably ready for discharge in next 24-48 hrs.  He will need prompt out-pt Nephrology follow-up.              * No resolved hospital problems. *               Interval History:   no new complaints, up and ambulating, alert, oriented to person, place and time and doing well; no cp, sob, n/v/d, or abd pain.  Feeling ok.  VS fine.  Intake ok.  Voiding in good amts.  Wt up 0.5kg.  Meds and labs reviewed.  UC data now complete; broadly sensitive Klebsiella species.  Lytes same; nl except persistent mild NAGMA.  BUN/Cr same.  Phos acceptable, ~5.  Ca++ sl higher.  PTH modestly elevated, 360, as expected with this degree of CKD.  Await Vitamin D level; then decide on dosing of Vitamin D supplement.                      Medications:       amLODIPine  10 mg Oral QAM     aspirin  81 mg Oral Daily     cefTRIAXone  1 g Intravenous Q24H     cloNIDine  0.1 mg Oral Daily     insulin aspart  1-7 Units Subcutaneous TID AC     insulin aspart  1-5 Units Subcutaneous At Bedtime     insulin glargine  25 Units Subcutaneous At Bedtime     levothyroxine  50 mcg Oral Daily     metoprolol succinate  100 mg Oral Daily     simvastatin  10 mg Oral At Bedtime     tamsulosin  0.4 mg Oral Daily                      Physical Exam:       Vital Sign Ranges  Temp:  [97.2  F (36.2  C)-97.4  F (36.3  C)] 97.2  F (36.2  C)  Heart Rate:  [60-70] 68  Resp:  [16-20] 20  BP: (123-131)/(51-56) 123/56  SpO2:  [95 %-100 %] 100 %    Weight, current:  79.4 kg (actual weight)  Weight change:     I/O last 3 completed shifts:  In: 1350 [P.O.:1350]  Out: 1550  [Urine:1550]    Physical Exam:   General:  Patient comfortable up in chair, in no apparent distress.  Awake, alert, oriented x3.  Neck:  Supple, no JVD.  Lungs:  Clear to auscultation bilaterally.  Cardiac:  Regular rate and rhythm, no murmurs, rub, or gallops.  Abdomen:  Soft, nontender, physiologic sounds.  Extremities:  Without edema.  2+ pulses.  Skin:  Warm, dry.  Neurologic:  No focal deficits.             Data:        Lab Results   Component Value Date     10/14/2018    Lab Results   Component Value Date    CHLORIDE 113 (H) 10/14/2018    Lab Results   Component Value Date    BUN 64 (H) 10/14/2018      Lab Results   Component Value Date    POTASSIUM 4.7 10/14/2018    Lab Results   Component Value Date    CO2 18 (L) 10/14/2018    Lab Results   Component Value Date    CR 4.50 (H) 10/14/2018        Lab Results   Component Value Date     10/14/2018     10/13/2018     10/12/2018     Lab Results   Component Value Date    POTASSIUM 4.7 10/14/2018    POTASSIUM 4.7 10/13/2018    POTASSIUM 4.9 10/12/2018     Lab Results   Component Value Date    CHLORIDE 113 (H) 10/14/2018    CHLORIDE 117 (H) 10/13/2018    CHLORIDE 111 (H) 10/12/2018     Lab Results   Component Value Date    CO2 18 (L) 10/14/2018    CO2 18 (L) 10/13/2018    CO2 20 10/12/2018     Lab Results   Component Value Date    CR 4.50 (H) 10/14/2018    CR 4.41 (H) 10/13/2018    CR 4.27 (H) 10/12/2018     Lab Results   Component Value Date    BUN 64 (H) 10/14/2018    BUN 66 (H) 10/13/2018    BUN 69 (H) 10/12/2018     Lab Results   Component Value Date    HGB 8.4 (L) 10/14/2018    HGB 7.5 (L) 10/13/2018    HGB 8.9 (L) 10/12/2018     No results found for: PH, PHARTERIAL, PO2, [id number], SAT, PCO2, HCO3, BASEEXCESS, MACO, BEB          Adam Valerio MD  Nephrology; [employer] [facility] [employer]  [phone]

## 2018-10-14 NOTE — PLAN OF CARE
Problem: Patient Care Overview  Goal: Plan of Care/Patient Progress Review  Outcome: Improving  Patient alert and oriented x4. VSS. Remains hospitalized for NANDA and UTI. On IV rocephin.Denies any pain. All all dinner, HS BG 98, offered snacks but pt declined. Up independently in room. Voiding well, urine cloudy. Nephrology and urology  Following patient. Will continue plan of care

## 2018-10-14 NOTE — PLAN OF CARE
Problem: Patient Care Overview  Goal: Plan of Care/Patient Progress Review  Outcome: Improving  Ambulatory Status:  Pt up independent in room. Steady and calls well. A&Ox4  VS:  Vital signs:  Temp: 97.4  F (36.3  C) Temp src: Oral BP: 131/53   Heart Rate: 70 Resp: 16 SpO2: 95 % O2 Device: None (Room air)   Pain:  none  Resp: LS clear  GI:  no nausea.  fair appetite and on regular diet.  BS active.  Passing flatus.  Last BM today.  :  Voiding well, cloudy and yellow  Skin:  Normal  Tx:  IV rocefin  Labs:  BS 73 and 94. Creatine 4.5  Consults:  none  Disposition:  home

## 2018-10-14 NOTE — PLAN OF CARE
Problem: Patient Care Overview  Goal: Plan of Care/Patient Progress Review  Outcome: No Change  Pt a/o, up independent in room. VSS. UC growing klebsellia.  On IV rocephin. Voiding adequate amounts. Blood sugars was 69. Gave agnes crackers for snack. Pt slept through night. Plan to stay until renal function returns. Will continue to monitor.

## 2018-10-15 VITALS
RESPIRATION RATE: 18 BRPM | WEIGHT: 175 LBS | SYSTOLIC BLOOD PRESSURE: 169 MMHG | TEMPERATURE: 97.1 F | BODY MASS INDEX: 23.7 KG/M2 | OXYGEN SATURATION: 98 % | DIASTOLIC BLOOD PRESSURE: 67 MMHG | HEIGHT: 72 IN

## 2018-10-15 LAB
ANION GAP SERPL CALCULATED.3IONS-SCNC: 6 MMOL/L (ref 3–14)
BUN SERPL-MCNC: 63 MG/DL (ref 7–30)
CALCIUM SERPL-MCNC: 7.2 MG/DL (ref 8.5–10.1)
CHLORIDE SERPL-SCNC: 114 MMOL/L (ref 94–109)
CO2 SERPL-SCNC: 20 MMOL/L (ref 20–32)
COPATH REPORT: NORMAL
CREAT SERPL-MCNC: 4.65 MG/DL (ref 0.66–1.25)
ERYTHROCYTE [DISTWIDTH] IN BLOOD BY AUTOMATED COUNT: 13.3 % (ref 10–15)
GFR SERPL CREATININE-BSD FRML MDRD: 12 ML/MIN/1.7M2
GLUCOSE BLDC GLUCOMTR-MCNC: 154 MG/DL (ref 70–99)
GLUCOSE BLDC GLUCOMTR-MCNC: 76 MG/DL (ref 70–99)
GLUCOSE SERPL-MCNC: 55 MG/DL (ref 70–99)
HAPTOGLOB SERPL-MCNC: 90 MG/DL (ref 35–175)
HCT VFR BLD AUTO: 25.6 % (ref 40–53)
HGB BLD-MCNC: 8.1 G/DL (ref 13.3–17.7)
MCH RBC QN AUTO: 29.2 PG (ref 26.5–33)
MCHC RBC AUTO-ENTMCNC: 31.6 G/DL (ref 31.5–36.5)
MCV RBC AUTO: 92 FL (ref 78–100)
PLATELET # BLD AUTO: 206 10E9/L (ref 150–450)
POTASSIUM SERPL-SCNC: 4.8 MMOL/L (ref 3.4–5.3)
RBC # BLD AUTO: 2.77 10E12/L (ref 4.4–5.9)
SODIUM SERPL-SCNC: 140 MMOL/L (ref 133–144)
WBC # BLD AUTO: 8.8 10E9/L (ref 4–11)

## 2018-10-15 PROCEDURE — 25000132 ZZH RX MED GY IP 250 OP 250 PS 637: Performed by: INTERNAL MEDICINE

## 2018-10-15 PROCEDURE — 00000146 ZZHCL STATISTIC GLUCOSE BY METER IP

## 2018-10-15 PROCEDURE — 36415 COLL VENOUS BLD VENIPUNCTURE: CPT | Performed by: INTERNAL MEDICINE

## 2018-10-15 PROCEDURE — 99239 HOSP IP/OBS DSCHRG MGMT >30: CPT | Performed by: INTERNAL MEDICINE

## 2018-10-15 PROCEDURE — 25000132 ZZH RX MED GY IP 250 OP 250 PS 637: Performed by: HOSPITALIST

## 2018-10-15 PROCEDURE — 80048 BASIC METABOLIC PNL TOTAL CA: CPT | Performed by: INTERNAL MEDICINE

## 2018-10-15 PROCEDURE — 85027 COMPLETE CBC AUTOMATED: CPT | Performed by: INTERNAL MEDICINE

## 2018-10-15 RX ORDER — FUROSEMIDE 20 MG
20 TABLET ORAL DAILY
Qty: 30 TABLET | Refills: 0 | Status: SHIPPED | OUTPATIENT
Start: 2018-10-16

## 2018-10-15 RX ORDER — SODIUM BICARBONATE 650 MG/1
1300 TABLET ORAL 2 TIMES DAILY
Qty: 60 TABLET | Refills: 0 | Status: SHIPPED | OUTPATIENT
Start: 2018-10-15

## 2018-10-15 RX ORDER — INSULIN GLARGINE 100 [IU]/ML
15 INJECTION, SOLUTION SUBCUTANEOUS AT BEDTIME
Qty: 15 ML | Refills: 0 | Status: SHIPPED | OUTPATIENT
Start: 2018-10-15

## 2018-10-15 RX ORDER — TAMSULOSIN HYDROCHLORIDE 0.4 MG/1
0.4 CAPSULE ORAL DAILY
Qty: 30 CAPSULE | Refills: 0 | Status: SHIPPED | OUTPATIENT
Start: 2018-10-16

## 2018-10-15 RX ORDER — LEVOFLOXACIN 500 MG/1
500 TABLET, FILM COATED ORAL
Qty: 2 TABLET | Refills: 0 | Status: SHIPPED | OUTPATIENT
Start: 2018-10-15 | End: 2018-10-18

## 2018-10-15 RX ADMIN — SODIUM BICARBONATE 650 MG TABLET 1300 MG: at 08:44

## 2018-10-15 RX ADMIN — METOPROLOL SUCCINATE 100 MG: 100 TABLET, EXTENDED RELEASE ORAL at 08:44

## 2018-10-15 RX ADMIN — AMLODIPINE BESYLATE 10 MG: 10 TABLET ORAL at 08:45

## 2018-10-15 RX ADMIN — FUROSEMIDE 20 MG: 20 TABLET ORAL at 08:45

## 2018-10-15 RX ADMIN — ASPIRIN 81 MG 81 MG: 81 TABLET ORAL at 08:45

## 2018-10-15 RX ADMIN — TAMSULOSIN HYDROCHLORIDE 0.4 MG: 0.4 CAPSULE ORAL at 08:45

## 2018-10-15 RX ADMIN — LEVOTHYROXINE SODIUM 50 MCG: 50 TABLET ORAL at 08:45

## 2018-10-15 RX ADMIN — CLONIDINE HYDROCHLORIDE 0.1 MG: 0.1 TABLET ORAL at 08:45

## 2018-10-15 ASSESSMENT — ACTIVITIES OF DAILY LIVING (ADL)
ADLS_ACUITY_SCORE: 9

## 2018-10-15 NOTE — DISCHARGE SUMMARY
[facility]  Discharge Summary  Hospitalist      Date of Admission:  10/12/2018  Date of Discharge:  10/15/2018  Provider:  Dillon Chery MD. [facility]  Date of Service (when I last saw the patient): 10/15/18      Primary Provider: Negrito Jose          Discharge Diagnosis:     Discharge Diagnoses   Acute versus chronic renal failure with nephrotic range proteinuria  Urinary tract infection  Normocytic anemia  Hypertension  Benign prostatic hypertrophy with significantly elevated PSA requiring further follow-up  Diabetes mellitus  History of atrial fibrillation, AV block post ablation and pacemaker  Hypothyroidism    Other medical issues:  Past Medical History:   Diagnosis Date     Atrial fibrillation (H)      AV block, Mobitz 2      Diabetes mellitus type 2 with complications (H)      Diabetic neuropathy (H)      HTN (hypertension)      Hyperlipidemia LDL goal < 130          Please see the admission history and physical for full details.     Hospital Course     Brady Oviedo was admitted on 10/12/2018.  The following problems were addressed during his hospitalization:  73 year old male with a history of atrial fibrillation and AV block Mobitz type II status post pacemaker implantation, type 2 diabetes mellitus, diabetic neuropathy, hypertension, hyperlipidemia, chronic kidney disease who vented 10/12 with abnormal labs.      Problem List:  1.  Acute versus chronic renal failure with nephrotic range proteinuria: Creatinine in March 2017 was 2.6.  Currently it is 4.3, on admission, 4.6 on discharge.  I have no values in between that time.  I suspect this is probably progression of chronic renal failure given the failure to improve overnight on IV fluids.  Electrolytes and volume status are reasonable.  He is only mildly acidotic this morning but I suspect that is more related to the normal saline.  Nothing to suggest uremic symptoms.  High phosphorus level also supportive of morbid chronic  "progressive kidney disease.  FENa is not suggestive of prerenal etiology.  Renal ultrasound shows no abnormalities.  To note he does endorse urinary frequency in the context of a rising PSA so he could have some obstructive nephropathy.  He has a history of nephrotic range proteinuria.  He is not edematous and otherwise shows no clinical signs of nephrotic syndrome.  Lipid panel fairly unremarkable in the context of evaluating for nephrotic syndrome.  Nephrology saw the patient: \"Doubt any potentially reversible NANDA.  Wt stable and fluid balance looks ok.  No immediate need for dialysis support. Add loop diuretic and supplemental NaHCO3 now.\"  Needs follow-up with nephrology and urology as outpatient at [facility]     2.  Normocytic anemia: Again he has had about a 2 g drop in his hemoglobin over the last 1.5 years.  He denies any bleeding.  I suspect this is anemia of chronic kidney disease.  Will check iron studies, B12/folate, LDH, haptoglobin, reticulocyte count, and blood smear for the sake of completion.  He has no symptoms associated with his anemia and vital signs are stable so no transfusion is warranted.  Hemoglobin did drop overnight but I suspect this is hemodilution.  Defer to outpatient nephrology if iron infusion or if Epogen is required.      3.  Urinary tract infection: Grossly abnormal urinalysis in the ED. culture showing Klebsiella, he was on Rocephin in the hospital, changed to Levaquin on discharge renally dosed for 2 more doses every 48 hours     4.  Hypertension: Blood pressure is reasonable at this time but slightly elevated.  Will resume his amlodipine 10 mg daily, clonidine 0.1 mg daily , and metoprolol succinate 100 mg daily.  Hold his chlorthalidone in the context of renal dysfunction.  Nephrology did not recommend any ACE inhibitor or ARB      5.  Elevated PSA with urinary frequency: Differential diagnosis would include BPH versus prostate cancer.  He clearly needs outpatient " urology follow-up and likely prostate biopsy.  Ultrasound shows prostatic enlargement but no other specific abnormalities.      6.  Diabetes mellitus: Current blood sugars reasonable.  Continue  Lantus decreased to 15 units from 25 units at bedtime because of hypoglycemia likely related to worsening kidney function as well as a medium sliding scale insulin.    Needs close follow-up      7.  History of atrial fibrillation and Mobitz type II AV block and sick sinus syndrome status post pacemaker implantation: Followed by cardiology.  Appears as though his device is functioning properly.  We will continue his prior to admission metoprolol.  No further changes.      8.  Hypothyroidism: TSH normal and continue his levothyroxine 50 mcg daily.      Pending Results   Unresulted Labs Ordered in the Past 30 Days of this Admission     Date and Time Order Name Status Description    10/13/2018 0654 Vitamin D Deficiency In process           Discharge Orders     Reason for your hospital stay   Renal failure acute on chronic     Follow-up and recommended labs and tests    Follow-up with primary care physician in 2-3 days with a repeat basic metabolic panel  Follow-up with urology and nephrology in the next 2 weeks     Full Code     Diet   Follow this diet upon discharge: Orders Placed This Encounter     Combination Diet Renal Diet; 3767-7026 Calories: Moderate Consistent CHO (4-6 CHO units/meal)         Code Status   Full Code       Primary Care Physician   Negrito Jose    Physical Exam   Temp: 97.1  F (36.2  C) Temp src: Oral BP: 169/67   Heart Rate: 76 Resp: 18 SpO2: 98 % O2 Device: None (Room air)    Vitals:    10/13/18 0523 10/14/18 0824 10/15/18 0637   Weight: 78.9 kg (173 lb 14.4 oz) 79.4 kg (175 lb) 79.4 kg (175 lb)     Vital Signs with Ranges  Temp:  [97.1  F (36.2  C)-97.2  F (36.2  C)] 97.1  F (36.2  C)  Heart Rate:  [63-76] 76  Resp:  [18-20] 18  BP: (123-169)/(56-73) 169/67  SpO2:  [97 %-100 %] 98 %  I/O last 3  completed shifts:  In: 1850 [P.O.:1850]  Out: 2175 [Urine:2175]    Constitutional:  alert, cooperative, no apparent distress  Respiratory: No increased work of breathing, good air exchange, no crackles or wheezing.  Cardiovascular: apical impulse,normal S1 and S2  GI: bowel sounds present, soft, non-distended, non-tender      Discharge Disposition   Discharged to home    Consultations This Hospital Stay   NEPHROLOGY IP CONSULT  NUTRITION SERVICES ADULT IP CONSULT    Time Spent on this Encounter   I, Dillon Chery, personally saw the patient today and spent greater than 30 minutes discharging this patient.      Discharge Medications   Current Discharge Medication List      START taking these medications    Details   furosemide (LASIX) 20 MG tablet Take 1 tablet (20 mg) by mouth daily  Qty: 30 tablet, Refills: 0    Associated Diagnoses: Renal failure, unspecified chronicity      insulin lispro (HUMALOG) 100 UNIT/ML injection For Pre-Meal  - 189 give 1 unit.   For Pre-Meal  - 239 give 2 units.   For Pre-Meal  - 289 give 3 units.   For Pre-Meal  - 339 give 4 units.   For Pre-Meal -399 give 5 units.  For Pre-Meal -449 give 6 units.  For Pre-Meal BG = or > 450 give 7 units.  Qty: 1 vial, Refills: 0    Associated Diagnoses: Type II diabetes mellitus with peripheral circulatory disorder (H)      levofloxacin (LEVAQUIN) 500 MG tablet Take 1 tablet (500 mg) by mouth every 48 hours for 2 doses  Qty: 2 tablet, Refills: 0    Associated Diagnoses: Urinary tract infection without hematuria, site unspecified      sodium bicarbonate 650 MG tablet Take 2 tablets (1,300 mg) by mouth 2 times daily  Qty: 60 tablet, Refills: 0    Associated Diagnoses: Renal failure, unspecified chronicity      tamsulosin (FLOMAX) 0.4 MG capsule Take 1 capsule (0.4 mg) by mouth daily  Qty: 30 capsule, Refills: 0    Associated Diagnoses: Benign prostatic hyperplasia with lower urinary tract symptoms, symptom  details unspecified         CONTINUE these medications which have CHANGED    Details   BASAGLAR 100 UNIT/ML injection Inject 15 Units Subcutaneous At Bedtime  Qty: 15 mL, Refills: 0    Associated Diagnoses: Type II diabetes mellitus with peripheral circulatory disorder (H)         CONTINUE these medications which have NOT CHANGED    Details   amLODIPine (NORVASC) 10 MG tablet Take 10 mg by mouth every morning      aspirin 81 MG chewable tablet Take 1 tablet (81 mg) by mouth daily  Qty: 90 tablet, Refills: 4    Associated Diagnoses: Atrial fibrillation (H)      cloNIDine (CATAPRES) 0.1 MG tablet TAKE 1 TABLET BY MOUTH DAILY  Qty: 90 tablet, Refills: 0    Associated Diagnoses: Benign essential hypertension      ferrous sulfate (IRON) 325 (65 FE) MG tablet Take 1 tablet (325 mg) by mouth daily (with breakfast)  Qty: 30 tablet, Refills: 2    Associated Diagnoses: Iron deficiency anemia      levothyroxine (SYNTHROID/LEVOTHROID) 50 MCG tablet TAKE 1 TABLET BY MOUTH DAILY  Qty: 90 tablet, Refills: 0    Associated Diagnoses: Other specified hypothyroidism      metoprolol succinate (TOPROL-XL) 100 MG 24 hr tablet TAKE 1 TABLET(100 MG) BY MOUTH DAILY  Qty: 90 tablet, Refills: 0    Associated Diagnoses: Benign essential hypertension      simvastatin (ZOCOR) 10 MG tablet TAKE 1 TABLET(10 MG) BY MOUTH AT BEDTIME  Qty: 90 tablet, Refills: 0    Comments: Pt due for appt/labs in Sept. Needs to call our office.  Associated Diagnoses: Hyperlipidemia with target LDL less than 100      !! B-D U/F 31G X 8 MM insulin pen needle USE 1 NEEDLE EVERY DAY OR AS DIRECTED  Qty: 30 each, Refills: 0    Associated Diagnoses: Diabetic polyneuropathy associated with diabetes mellitus due to underlying condition (H)      Blood Glucose Calibration (ACCU-CHEK COMPACT BLUE CONTROL) LIQD Use to calibrate blood glucose monitor as directed.  Qty: 1 each, Refills: 0    Associated Diagnoses: Type 2 diabetes, HbA1C goal < 8% (H)      blood glucose monitoring  (ACCU-CHEK COMPACT DRUM) test strip Use to test blood sugars 3 times daily or as directed.  Qty: 100 each, Refills: 3    Associated Diagnoses: Diabetic polyneuropathy associated with diabetes mellitus due to underlying condition (H)      blood glucose monitoring (ACCU-CHEK MULTICLIX) lancets Use to test blood sugar 3-4 times daily or as directed.  Qty: 3 Box, Refills: 3    Associated Diagnoses: Diabetic polyneuropathy associated with diabetes mellitus due to underlying condition (H)      Blood Glucose Monitoring Suppl (ACCU-CHEK COMPACT CARE KIT) KIT Use to test blood sugars 3 times daily or as directed.  Qty: 1 kit, Refills: 0    Associated Diagnoses: Type 2 diabetes, HbA1C goal < 8% (H)      !! insulin pen needle 31G X 6 MM Daily insulin injections  Qty: 100 each, Refills: 0    Associated Diagnoses: Diabetic polyneuropathy associated with diabetes mellitus due to underlying condition (H)       !! - Potential duplicate medications found. Please discuss with provider.      STOP taking these medications       chlorthalidone (HYGROTON) 25 MG tablet Comments:   Reason for Stopping:             Allergies   No Known Allergies  Data   Most Recent 3 CBC's:  Recent Labs   Lab Test  10/15/18   0721  10/14/18   0901  10/13/18   0654  10/12/18   1442   WBC  8.8   --   8.0  6.6   HGB  8.1*  8.4*  7.5*  8.9*   MCV  92   --   92  92   PLT  206   --   184  228      Most Recent 3 BMP's:  Recent Labs   Lab Test  10/15/18   0721  10/14/18   0901  10/13/18   0654   NA  140  140  143   POTASSIUM  4.8  4.7  4.7   CHLORIDE  114*  113*  117*   CO2  20  18*  18*   BUN  63*  64*  66*   CR  4.65*  4.50*  4.41*   ANIONGAP  6  9  8   SERGIO  7.2*  7.0*  6.6*   GLC  55*  84  62*     Most Recent 2 LFT's:  Recent Labs   Lab Test  10/12/18   1442  03/13/17   0923   09/10/14   0832   AST  6   --    --   12   ALT  11  16   < >  12   ALKPHOS  96   --    --   87   BILITOTAL  0.4   --    --   0.3    < > = values in this interval not displayed.     Most  Recent INR's and Anticoagulation Dosing History:  Anticoagulation Dose History     Recent Dosing and Labs Latest Ref Rng & Units 11/24/2013 3/26/2014    INR 0.86 - 1.14 1.20(H) 0.98        Most Recent 3 Troponin's:  Recent Labs   Lab Test  03/25/14   1625  11/25/13 2010 11/25/13   1650  11/25/13   1248   TROPI   --   0.015  0.017  0.023   TROPONIN  0.01   --    --    --      Most Recent Cholesterol Panel:  Recent Labs   Lab Test  10/13/18   0654   CHOL  99   LDL  48   HDL  29*   TRIG  112     Most Recent 6 Bacteria Isolates From Any Culture (See EPIC Reports for Culture Details):  Recent Labs   Lab Test  10/12/18   1441  11/26/13   1239  11/26/13   1226  11/24/13   0824  11/22/13   1925  11/22/13   1915   CULT  50,000 to 100,000 colonies/mL  Klebsiella oxytoca  *  No anaerobes isolated  On day 3, isolated in broth only: Beta hemolytic Streptococcus group B Susceptibility testing done on previous specimen  No anaerobes isolated  No growth  Culture negative after 29 days  No anaerobes isolated  Heavy growth Beta hemolytic Streptococcus group B  Cultured on the 1st day of incubation: Beta hemolytic Streptococcus group B Critical Value, preliminary result only, called to and read back by Royer Cochran RN on 11/23/2013 @1935, tk  No growth     Most Recent TSH, T4 and A1c Labs:  Recent Labs   Lab Test  10/12/18   1442   03/10/14   0749   TSH  3.74   < >  6.92*   T4   --    --   0.76   A1C  7.0*   < >  7.0*    < > = values in this interval not displayed.     Results for orders placed or performed during the hospital encounter of 10/12/18   US Renal Complete    Narrative    COMPLETE RENAL ULTRASOUND   10/12/2018 7:46 PM    HISTORY: Acute renal failure.    COMPARISON: None.    FINDINGS: The right kidney measures 11.3 x 5.5 x 6.2 cm and the left  measures 11.0 x 4.7 x 5.2 cm. Renal cortical thickness is 1.4 cm on  the right and 1.3 cm on the left. The kidneys demonstrate normal  echogenicity with no calculus or  abnormal mass. No hydronephrosis is  seen. The bladder is partly distended. No bladder wall thickening or  abnormal mass is seen. There appears to be a small amount of debris  within the bladder. An enlarged prostate gland is demonstrated.      Impression    IMPRESSION: Prostatic enlargement. There may be a small amount of  debris within the bladder. This is of uncertain etiology. No focal  mass is seen.     TRESA HODGES MD           Disclaimer: This note consists of symbols derived from keyboarding, dictation and/or voice recognition software. As a result, there may be errors in the script that have gone undetected. Please consider this when interpreting information found in this chart.

## 2018-10-15 NOTE — PLAN OF CARE
Problem: Patient Care Overview  Goal: Plan of Care/Patient Progress Review  Outcome: Adequate for Discharge Date Met: 10/15/18  Pt to discharge to home, spouse for support  Reviewed discharge instructions denies questions or concerns  Sent with several new medications from Discharge pharmacy

## 2018-10-15 NOTE — PLAN OF CARE
Problem: Patient Care Overview  Goal: Plan of Care/Patient Progress Review  Outcome: Improving  [place]iken: A&O   VS: /73 (BP Location: Left arm)  Temp 97.1  F (36.2  C) (Oral)  Resp 18  Ht 1.829 m (6')  Wt 79.4 kg (175 lb)  SpO2 97%  BMI 23.73 kg/m2  LS: clear on RA  GI: active, last BM today  : voiding in urinal w/o difficulty  Skin: intact  Activity: ind   Diet: reg   Pain: denies  Lab: BS 76, given OJ  Plan: IV rocephin, PO lasix, nutrition consult, possible discharge tmrrw

## 2018-10-15 NOTE — PLAN OF CARE
Problem: Patient Care Overview  Goal: Plan of Care/Patient Progress Review    Calcium: A&O   VS: /56 (BP Location: Left arm)  Temp 97.2  F (36.2  C) (Oral)  Resp 20  Ht 1.829 m (6')  Wt 79.4 kg (175 lb)  SpO2 100%  BMI 23.73 kg/m2  LS: clear on RA  GI: active, last BM today  : voiding in urinal w/o difficulty  Skin: intact  Activity: ind   Diet: reg   Pain: denies  Lab: , 154  Plan: IV rocephin, PO lasix, nutrition, possible discharge tmrrw

## 2018-10-15 NOTE — CONSULTS
NUTRITION EDUCATION      REASON FOR ASSESSMENT:  RN Consult - Needs renal diabetic diet education prior to discharge     NUTRITION HISTORY:  Information obtained from patient and wife:  - Regular diet at home with meals TID and snacks. Wife states that over time, they have decreased the amount of sweets in the house (she does not bake as much as she used to). Pt reports that he has his blood glucose under control. He checks his BG every night and reports that is it typically between  and his A1C has been between 6.7-7 over the last year. He takes nightly insulin.   - Pt frequently enjoys bananas, milk, G2 Gatorade, and cheese.  He understands that label reading will be an important part of meal planning once discharged home.  - Pt and wife state understanding of diabetes related diet instructions. They have not received formal diet education on kidney disease in the past.     CURRENT DIET:  Renal/Mod CHO      Recent Labs  Lab 10/15/18  0721 10/15/18  0236 10/14/18  2144 10/14/18  1708 10/14/18  1205 10/14/18  0901 10/14/18  0755 10/14/18  0221  10/13/18  0654  10/12/18  1442   GLC 55*  --   --   --   --  84  --   --   --  62*  --  176*   BGM  --  76 154* 125* 97  --  73 69*  < >  --   < >  --    < > = values in this interval not displayed.    Lab Results   Component Value Date    A1C 7.0 10/12/2018    A1C 7.2 03/13/2017    A1C 8.8 06/28/2016    A1C 6.9 10/08/2015    A1C 6.2 06/11/2015       NUTRITION DIAGNOSIS:  Food- and nutrition-related knowledge deficit R/t no formal CKD diet education previously received AEB pt report    INTERVENTIONS:    Nutrition Prescription:  Per Nephrology, patient to be on Renal/diabetic diet --> RD updated diet to reflect this    Continue diet per Nephrology     Implementation:      *  Nutrition Education (Content):   A)  Provided handouts: Stage 5 CKD Nutrition Therapy, Type 2 Diabetes Nutrition Therapy, Room service menus for Potassium, Phosphorus, and Carbohydrate content in  foods   B)  Discussed the importance of monitoring Na, Phos, and K for CKD, in addition to continuing a moderate carbohydrate intake for type 2 diabetes. Discussed foods high and low in the above electrolytes, and provided sample meal plans and shopping lists. Encouraged the use of label reading and food logs for adequacy.       *  Nutrition Education (Application):   A)  Discussed current eating habits and recommended alternative food choices      *  Anticipate good compliance      *  Diet Education - refer to Education Flowsheet    Goals:      *  Patient and wife verbalize understanding of diets      *  All of the above goals met during the education session    Follow Up/Monitoring:      *  RD will be available for future questions. No further questions at this time.       *  Recommended Out-Patient Nutrition Referral, if further diet instructions are needed      Mary Llanes RD, LD  Clinical Dietitian

## 2018-10-16 ENCOUNTER — TELEPHONE (OUTPATIENT)
Dept: INTERNAL MEDICINE | Facility: CLINIC | Age: 73
End: 2018-10-16

## 2018-10-16 LAB — DEPRECATED CALCIDIOL+CALCIFEROL SERPL-MC: 6 UG/L (ref 20–75)

## 2018-10-17 NOTE — TELEPHONE ENCOUNTER
Called and spoke with patient who states he's doing good since hospital discharge. Patient states he's seeing his primary provider at [facility] this afternoon for follow up.

## 2019-03-11 ENCOUNTER — HOSPITAL ENCOUNTER (OUTPATIENT)
Dept: NUCLEAR MEDICINE | Facility: CLINIC | Age: 74
Setting detail: NUCLEAR MEDICINE
Discharge: HOME | End: 2019-03-11
Attending: UROLOGY | Admitting: UROLOGY
Payer: COMMERCIAL

## 2019-03-11 DIAGNOSIS — C61 PROSTATE CANCER (H): ICD-10-CM

## 2019-03-11 PROCEDURE — 34300033 ZZH RX 343: Performed by: RADIOLOGY

## 2019-03-11 PROCEDURE — A9561 TC99M OXIDRONATE: HCPCS | Performed by: RADIOLOGY

## 2019-03-11 PROCEDURE — 78306 BONE IMAGING WHOLE BODY: CPT

## 2019-03-11 RX ADMIN — Medication 24 MCI.: at 13:00

## 2019-05-03 ENCOUNTER — DOCUMENTATION ONLY (OUTPATIENT)
Dept: CARDIOLOGY | Facility: CLINIC | Age: 74
End: 2019-05-03

## 2019-05-08 ENCOUNTER — ANCILLARY PROCEDURE (OUTPATIENT)
Dept: CARDIOLOGY | Facility: CLINIC | Age: 74
End: 2019-05-08
Attending: INTERNAL MEDICINE
Payer: COMMERCIAL

## 2019-05-08 DIAGNOSIS — Z95.0 CARDIAC PACEMAKER IN SITU: ICD-10-CM

## 2019-05-08 PROCEDURE — 93280 PM DEVICE PROGR EVAL DUAL: CPT | Performed by: INTERNAL MEDICINE

## 2019-06-03 ENCOUNTER — ANCILLARY PROCEDURE (OUTPATIENT)
Dept: CARDIOLOGY | Facility: CLINIC | Age: 74
End: 2019-06-03
Attending: INTERNAL MEDICINE
Payer: COMMERCIAL

## 2019-06-03 DIAGNOSIS — Z95.0 CARDIAC PACEMAKER IN SITU: ICD-10-CM

## 2019-06-04 LAB
MDC_IDC_LEAD_IMPLANT_DT: NORMAL
MDC_IDC_LEAD_IMPLANT_DT: NORMAL
MDC_IDC_LEAD_LOCATION: NORMAL
MDC_IDC_LEAD_LOCATION: NORMAL
MDC_IDC_LEAD_LOCATION_DETAIL_1: NORMAL
MDC_IDC_LEAD_LOCATION_DETAIL_1: NORMAL
MDC_IDC_LEAD_MFG: NORMAL
MDC_IDC_LEAD_MFG: NORMAL
MDC_IDC_LEAD_MODEL: NORMAL
MDC_IDC_LEAD_MODEL: NORMAL
MDC_IDC_LEAD_POLARITY_TYPE: NORMAL
MDC_IDC_LEAD_POLARITY_TYPE: NORMAL
MDC_IDC_LEAD_SERIAL: NORMAL
MDC_IDC_LEAD_SERIAL: NORMAL
MDC_IDC_MSMT_BATTERY_REMAINING_LONGEVITY: 138 MO
MDC_IDC_MSMT_BATTERY_STATUS: NORMAL
MDC_IDC_MSMT_BATTERY_VOLTAGE: 2.96 V
MDC_IDC_MSMT_LEADCHNL_RA_IMPEDANCE_VALUE: 337.5 OHM
MDC_IDC_MSMT_LEADCHNL_RA_PACING_THRESHOLD_AMPLITUDE: 0.5 V
MDC_IDC_MSMT_LEADCHNL_RA_PACING_THRESHOLD_AMPLITUDE: 0.5 V
MDC_IDC_MSMT_LEADCHNL_RA_PACING_THRESHOLD_PULSEWIDTH: 0.5 MS
MDC_IDC_MSMT_LEADCHNL_RA_PACING_THRESHOLD_PULSEWIDTH: 0.5 MS
MDC_IDC_MSMT_LEADCHNL_RA_SENSING_INTR_AMPL: 1.3 MV
MDC_IDC_MSMT_LEADCHNL_RV_IMPEDANCE_VALUE: 362.5 OHM
MDC_IDC_MSMT_LEADCHNL_RV_PACING_THRESHOLD_AMPLITUDE: 0.75 V
MDC_IDC_MSMT_LEADCHNL_RV_PACING_THRESHOLD_AMPLITUDE: 0.75 V
MDC_IDC_MSMT_LEADCHNL_RV_PACING_THRESHOLD_PULSEWIDTH: 0.5 MS
MDC_IDC_MSMT_LEADCHNL_RV_PACING_THRESHOLD_PULSEWIDTH: 0.5 MS
MDC_IDC_MSMT_LEADCHNL_RV_SENSING_INTR_AMPL: 5.9 MV
MDC_IDC_PG_IMPLANT_DTM: NORMAL
MDC_IDC_PG_MFG: NORMAL
MDC_IDC_PG_MODEL: NORMAL
MDC_IDC_PG_SERIAL: NORMAL
MDC_IDC_PG_TYPE: NORMAL
MDC_IDC_SESS_CLINIC_NAME: NORMAL
MDC_IDC_SESS_DTM: NORMAL
MDC_IDC_SESS_TYPE: NORMAL
MDC_IDC_SET_BRADY_AT_MODE_SWITCH_MODE: NORMAL
MDC_IDC_SET_BRADY_AT_MODE_SWITCH_RATE: 180 {BEATS}/MIN
MDC_IDC_SET_BRADY_HYSTRATE: NORMAL
MDC_IDC_SET_BRADY_LOWRATE: 55 {BEATS}/MIN
MDC_IDC_SET_BRADY_MAX_SENSOR_RATE: 140 {BEATS}/MIN
MDC_IDC_SET_BRADY_MAX_TRACKING_RATE: 140 {BEATS}/MIN
MDC_IDC_SET_BRADY_MODE: NORMAL
MDC_IDC_SET_BRADY_NIGHT_RATE: NORMAL
MDC_IDC_SET_BRADY_PAV_DELAY_LOW: 200 MS
MDC_IDC_SET_BRADY_SAV_DELAY_LOW: 170 MS
MDC_IDC_SET_LEADCHNL_RA_PACING_AMPLITUDE: 5 V
MDC_IDC_SET_LEADCHNL_RA_PACING_ANODE_ELECTRODE_1: NORMAL
MDC_IDC_SET_LEADCHNL_RA_PACING_ANODE_LOCATION_1: NORMAL
MDC_IDC_SET_LEADCHNL_RA_PACING_CAPTURE_MODE: NORMAL
MDC_IDC_SET_LEADCHNL_RA_PACING_CATHODE_ELECTRODE_1: NORMAL
MDC_IDC_SET_LEADCHNL_RA_PACING_CATHODE_LOCATION_1: NORMAL
MDC_IDC_SET_LEADCHNL_RA_PACING_POLARITY: NORMAL
MDC_IDC_SET_LEADCHNL_RA_PACING_PULSEWIDTH: 0.5 MS
MDC_IDC_SET_LEADCHNL_RA_SENSING_ADAPTATION_MODE: NORMAL
MDC_IDC_SET_LEADCHNL_RA_SENSING_ANODE_ELECTRODE_1: NORMAL
MDC_IDC_SET_LEADCHNL_RA_SENSING_ANODE_LOCATION_1: NORMAL
MDC_IDC_SET_LEADCHNL_RA_SENSING_CATHODE_ELECTRODE_1: NORMAL
MDC_IDC_SET_LEADCHNL_RA_SENSING_CATHODE_LOCATION_1: NORMAL
MDC_IDC_SET_LEADCHNL_RA_SENSING_POLARITY: NORMAL
MDC_IDC_SET_LEADCHNL_RV_PACING_AMPLITUDE: 0.88
MDC_IDC_SET_LEADCHNL_RV_PACING_ANODE_ELECTRODE_1: NORMAL
MDC_IDC_SET_LEADCHNL_RV_PACING_ANODE_LOCATION_1: NORMAL
MDC_IDC_SET_LEADCHNL_RV_PACING_CAPTURE_MODE: NORMAL
MDC_IDC_SET_LEADCHNL_RV_PACING_CATHODE_ELECTRODE_1: NORMAL
MDC_IDC_SET_LEADCHNL_RV_PACING_CATHODE_LOCATION_1: NORMAL
MDC_IDC_SET_LEADCHNL_RV_PACING_POLARITY: NORMAL
MDC_IDC_SET_LEADCHNL_RV_PACING_PULSEWIDTH: 0.5 MS
MDC_IDC_SET_LEADCHNL_RV_SENSING_ANODE_ELECTRODE_1: NORMAL
MDC_IDC_SET_LEADCHNL_RV_SENSING_ANODE_LOCATION_1: NORMAL
MDC_IDC_SET_LEADCHNL_RV_SENSING_CATHODE_ELECTRODE_1: NORMAL
MDC_IDC_SET_LEADCHNL_RV_SENSING_CATHODE_LOCATION_1: NORMAL
MDC_IDC_SET_LEADCHNL_RV_SENSING_POLARITY: NORMAL
MDC_IDC_SET_LEADCHNL_RV_SENSING_SENSITIVITY: 2 MV
MDC_IDC_STAT_AT_MODE_SW_COUNT: 35
MDC_IDC_STAT_BRADY_RA_PERCENT_PACED: 0.83 %
MDC_IDC_STAT_BRADY_RV_PERCENT_PACED: 63 %

## 2019-06-09 LAB
MDC_IDC_LEAD_IMPLANT_DT: NORMAL
MDC_IDC_LEAD_IMPLANT_DT: NORMAL
MDC_IDC_LEAD_LOCATION: NORMAL
MDC_IDC_LEAD_LOCATION: NORMAL
MDC_IDC_LEAD_LOCATION_DETAIL_1: NORMAL
MDC_IDC_LEAD_LOCATION_DETAIL_1: NORMAL
MDC_IDC_LEAD_MFG: NORMAL
MDC_IDC_LEAD_MFG: NORMAL
MDC_IDC_LEAD_MODEL: NORMAL
MDC_IDC_LEAD_MODEL: NORMAL
MDC_IDC_LEAD_POLARITY_TYPE: NORMAL
MDC_IDC_LEAD_POLARITY_TYPE: NORMAL
MDC_IDC_LEAD_SERIAL: NORMAL
MDC_IDC_LEAD_SERIAL: NORMAL
MDC_IDC_MSMT_BATTERY_DTM: NORMAL
MDC_IDC_MSMT_BATTERY_REMAINING_LONGEVITY: 131 MO
MDC_IDC_MSMT_BATTERY_REMAINING_PERCENTAGE: 95.5 %
MDC_IDC_MSMT_BATTERY_RRT_TRIGGER: NORMAL
MDC_IDC_MSMT_BATTERY_STATUS: NORMAL
MDC_IDC_MSMT_BATTERY_VOLTAGE: 2.96 V
MDC_IDC_MSMT_LEADCHNL_RA_IMPEDANCE_VALUE: 330 OHM
MDC_IDC_MSMT_LEADCHNL_RA_LEAD_CHANNEL_STATUS: NORMAL
MDC_IDC_MSMT_LEADCHNL_RA_PACING_THRESHOLD_AMPLITUDE: 0.62 V
MDC_IDC_MSMT_LEADCHNL_RA_PACING_THRESHOLD_PULSEWIDTH: 0.5 MS
MDC_IDC_MSMT_LEADCHNL_RA_SENSING_INTR_AMPL: 1.2 MV
MDC_IDC_MSMT_LEADCHNL_RV_IMPEDANCE_VALUE: 350 OHM
MDC_IDC_MSMT_LEADCHNL_RV_LEAD_CHANNEL_STATUS: NORMAL
MDC_IDC_MSMT_LEADCHNL_RV_PACING_THRESHOLD_AMPLITUDE: 0.62 V
MDC_IDC_MSMT_LEADCHNL_RV_PACING_THRESHOLD_PULSEWIDTH: 0.5 MS
MDC_IDC_MSMT_LEADCHNL_RV_SENSING_INTR_AMPL: 5.9 MV
MDC_IDC_PG_IMPLANT_DTM: NORMAL
MDC_IDC_PG_MFG: NORMAL
MDC_IDC_PG_MODEL: NORMAL
MDC_IDC_PG_SERIAL: NORMAL
MDC_IDC_PG_TYPE: NORMAL
MDC_IDC_SESS_CLINIC_NAME: NORMAL
MDC_IDC_SESS_DTM: NORMAL
MDC_IDC_SESS_REPROGRAMMED: NO
MDC_IDC_SESS_TYPE: NORMAL
MDC_IDC_SET_BRADY_AT_MODE_SWITCH_MODE: NORMAL
MDC_IDC_SET_BRADY_AT_MODE_SWITCH_RATE: 180 {BEATS}/MIN
MDC_IDC_SET_BRADY_LOWRATE: 55 {BEATS}/MIN
MDC_IDC_SET_BRADY_MAX_SENSOR_RATE: 140 {BEATS}/MIN
MDC_IDC_SET_BRADY_MAX_TRACKING_RATE: 140 {BEATS}/MIN
MDC_IDC_SET_BRADY_MODE: NORMAL
MDC_IDC_SET_BRADY_PAV_DELAY_LOW: 200 MS
MDC_IDC_SET_BRADY_SAV_DELAY_LOW: 170 MS
MDC_IDC_SET_LEADCHNL_RA_PACING_AMPLITUDE: 1.62
MDC_IDC_SET_LEADCHNL_RA_PACING_ANODE_ELECTRODE_1: NORMAL
MDC_IDC_SET_LEADCHNL_RA_PACING_ANODE_LOCATION_1: NORMAL
MDC_IDC_SET_LEADCHNL_RA_PACING_CAPTURE_MODE: NORMAL
MDC_IDC_SET_LEADCHNL_RA_PACING_CATHODE_ELECTRODE_1: NORMAL
MDC_IDC_SET_LEADCHNL_RA_PACING_CATHODE_LOCATION_1: NORMAL
MDC_IDC_SET_LEADCHNL_RA_PACING_POLARITY: NORMAL
MDC_IDC_SET_LEADCHNL_RA_PACING_PULSEWIDTH: 0.5 MS
MDC_IDC_SET_LEADCHNL_RA_SENSING_ADAPTATION_MODE: NORMAL
MDC_IDC_SET_LEADCHNL_RA_SENSING_ANODE_ELECTRODE_1: NORMAL
MDC_IDC_SET_LEADCHNL_RA_SENSING_ANODE_LOCATION_1: NORMAL
MDC_IDC_SET_LEADCHNL_RA_SENSING_CATHODE_ELECTRODE_1: NORMAL
MDC_IDC_SET_LEADCHNL_RA_SENSING_CATHODE_LOCATION_1: NORMAL
MDC_IDC_SET_LEADCHNL_RA_SENSING_POLARITY: NORMAL
MDC_IDC_SET_LEADCHNL_RA_SENSING_SENSITIVITY: 0.5 MV
MDC_IDC_SET_LEADCHNL_RV_PACING_AMPLITUDE: 0.88
MDC_IDC_SET_LEADCHNL_RV_PACING_ANODE_ELECTRODE_1: NORMAL
MDC_IDC_SET_LEADCHNL_RV_PACING_ANODE_LOCATION_1: NORMAL
MDC_IDC_SET_LEADCHNL_RV_PACING_CAPTURE_MODE: NORMAL
MDC_IDC_SET_LEADCHNL_RV_PACING_CATHODE_ELECTRODE_1: NORMAL
MDC_IDC_SET_LEADCHNL_RV_PACING_CATHODE_LOCATION_1: NORMAL
MDC_IDC_SET_LEADCHNL_RV_PACING_POLARITY: NORMAL
MDC_IDC_SET_LEADCHNL_RV_PACING_PULSEWIDTH: 0.5 MS
MDC_IDC_SET_LEADCHNL_RV_SENSING_ADAPTATION_MODE: NORMAL
MDC_IDC_SET_LEADCHNL_RV_SENSING_ANODE_ELECTRODE_1: NORMAL
MDC_IDC_SET_LEADCHNL_RV_SENSING_ANODE_LOCATION_1: NORMAL
MDC_IDC_SET_LEADCHNL_RV_SENSING_CATHODE_ELECTRODE_1: NORMAL
MDC_IDC_SET_LEADCHNL_RV_SENSING_CATHODE_LOCATION_1: NORMAL
MDC_IDC_SET_LEADCHNL_RV_SENSING_POLARITY: NORMAL
MDC_IDC_SET_LEADCHNL_RV_SENSING_SENSITIVITY: 2 MV
MDC_IDC_STAT_AT_BURDEN_PERCENT: 0 %
MDC_IDC_STAT_AT_DTM_END: NORMAL
MDC_IDC_STAT_AT_DTM_START: NORMAL
MDC_IDC_STAT_AT_MODE_SW_COUNT: 0
MDC_IDC_STAT_AT_MODE_SW_COUNT_PER_DAY: 0
MDC_IDC_STAT_AT_MODE_SW_PERCENT_TIME: 0 %
MDC_IDC_STAT_BRADY_AP_VP_PERCENT: 1.3 %
MDC_IDC_STAT_BRADY_AP_VS_PERCENT: 1 %
MDC_IDC_STAT_BRADY_AS_VP_PERCENT: 99 %
MDC_IDC_STAT_BRADY_AS_VS_PERCENT: 1 %
MDC_IDC_STAT_BRADY_DTM_END: NORMAL
MDC_IDC_STAT_BRADY_DTM_START: NORMAL
MDC_IDC_STAT_BRADY_RA_PERCENT_PACED: 1.3 %
MDC_IDC_STAT_BRADY_RV_PERCENT_PACED: 99 %
MDC_IDC_STAT_CRT_DTM_END: NORMAL
MDC_IDC_STAT_CRT_DTM_START: NORMAL
MDC_IDC_STAT_HEART_RATE_ATRIAL_MAX: 270 {BEATS}/MIN
MDC_IDC_STAT_HEART_RATE_ATRIAL_MEAN: 71 {BEATS}/MIN
MDC_IDC_STAT_HEART_RATE_ATRIAL_MIN: 50 {BEATS}/MIN
MDC_IDC_STAT_HEART_RATE_DTM_END: NORMAL
MDC_IDC_STAT_HEART_RATE_DTM_START: NORMAL
MDC_IDC_STAT_HEART_RATE_VENTRICULAR_MAX: 200 {BEATS}/MIN
MDC_IDC_STAT_HEART_RATE_VENTRICULAR_MEAN: 71 {BEATS}/MIN
MDC_IDC_STAT_HEART_RATE_VENTRICULAR_MIN: 40 {BEATS}/MIN

## 2019-07-01 ENCOUNTER — ANCILLARY PROCEDURE (OUTPATIENT)
Dept: CARDIOLOGY | Facility: CLINIC | Age: 74
End: 2019-07-01
Attending: INTERNAL MEDICINE
Payer: COMMERCIAL

## 2019-07-01 DIAGNOSIS — Z95.0 CARDIAC PACEMAKER IN SITU: ICD-10-CM

## 2019-07-02 DIAGNOSIS — Z95.0 CARDIAC PACEMAKER IN SITU: Primary | ICD-10-CM

## 2019-07-02 LAB
MDC_IDC_LEAD_IMPLANT_DT: NORMAL
MDC_IDC_LEAD_IMPLANT_DT: NORMAL
MDC_IDC_LEAD_LOCATION: NORMAL
MDC_IDC_LEAD_LOCATION: NORMAL
MDC_IDC_LEAD_LOCATION_DETAIL_1: NORMAL
MDC_IDC_LEAD_LOCATION_DETAIL_1: NORMAL
MDC_IDC_LEAD_MFG: NORMAL
MDC_IDC_LEAD_MFG: NORMAL
MDC_IDC_LEAD_MODEL: NORMAL
MDC_IDC_LEAD_MODEL: NORMAL
MDC_IDC_LEAD_POLARITY_TYPE: NORMAL
MDC_IDC_LEAD_POLARITY_TYPE: NORMAL
MDC_IDC_LEAD_SERIAL: NORMAL
MDC_IDC_LEAD_SERIAL: NORMAL
MDC_IDC_MSMT_BATTERY_DTM: NORMAL
MDC_IDC_MSMT_BATTERY_REMAINING_LONGEVITY: 135 MO
MDC_IDC_MSMT_BATTERY_REMAINING_PERCENTAGE: 95.5 %
MDC_IDC_MSMT_BATTERY_RRT_TRIGGER: NORMAL
MDC_IDC_MSMT_BATTERY_STATUS: NORMAL
MDC_IDC_MSMT_BATTERY_VOLTAGE: 2.96 V
MDC_IDC_MSMT_LEADCHNL_RA_IMPEDANCE_VALUE: 360 OHM
MDC_IDC_MSMT_LEADCHNL_RA_LEAD_CHANNEL_STATUS: NORMAL
MDC_IDC_MSMT_LEADCHNL_RA_PACING_THRESHOLD_AMPLITUDE: 0.62 V
MDC_IDC_MSMT_LEADCHNL_RA_PACING_THRESHOLD_PULSEWIDTH: 0.5 MS
MDC_IDC_MSMT_LEADCHNL_RA_SENSING_INTR_AMPL: 1.6 MV
MDC_IDC_MSMT_LEADCHNL_RV_IMPEDANCE_VALUE: 360 OHM
MDC_IDC_MSMT_LEADCHNL_RV_LEAD_CHANNEL_STATUS: NORMAL
MDC_IDC_MSMT_LEADCHNL_RV_PACING_THRESHOLD_AMPLITUDE: 0.62 V
MDC_IDC_MSMT_LEADCHNL_RV_PACING_THRESHOLD_PULSEWIDTH: 0.5 MS
MDC_IDC_MSMT_LEADCHNL_RV_SENSING_INTR_AMPL: 5.9 MV
MDC_IDC_PG_IMPLANT_DTM: NORMAL
MDC_IDC_PG_MFG: NORMAL
MDC_IDC_PG_MODEL: NORMAL
MDC_IDC_PG_SERIAL: NORMAL
MDC_IDC_PG_TYPE: NORMAL
MDC_IDC_SESS_CLINIC_NAME: NORMAL
MDC_IDC_SESS_DTM: NORMAL
MDC_IDC_SESS_REPROGRAMMED: NO
MDC_IDC_SESS_TYPE: NORMAL
MDC_IDC_SET_BRADY_AT_MODE_SWITCH_MODE: NORMAL
MDC_IDC_SET_BRADY_AT_MODE_SWITCH_RATE: 180 {BEATS}/MIN
MDC_IDC_SET_BRADY_LOWRATE: 55 {BEATS}/MIN
MDC_IDC_SET_BRADY_MAX_SENSOR_RATE: 140 {BEATS}/MIN
MDC_IDC_SET_BRADY_MAX_TRACKING_RATE: 140 {BEATS}/MIN
MDC_IDC_SET_BRADY_MODE: NORMAL
MDC_IDC_SET_BRADY_PAV_DELAY_LOW: 200 MS
MDC_IDC_SET_BRADY_SAV_DELAY_LOW: 170 MS
MDC_IDC_SET_LEADCHNL_RA_PACING_AMPLITUDE: 1.62
MDC_IDC_SET_LEADCHNL_RA_PACING_ANODE_ELECTRODE_1: NORMAL
MDC_IDC_SET_LEADCHNL_RA_PACING_ANODE_LOCATION_1: NORMAL
MDC_IDC_SET_LEADCHNL_RA_PACING_CAPTURE_MODE: NORMAL
MDC_IDC_SET_LEADCHNL_RA_PACING_CATHODE_ELECTRODE_1: NORMAL
MDC_IDC_SET_LEADCHNL_RA_PACING_CATHODE_LOCATION_1: NORMAL
MDC_IDC_SET_LEADCHNL_RA_PACING_POLARITY: NORMAL
MDC_IDC_SET_LEADCHNL_RA_PACING_PULSEWIDTH: 0.5 MS
MDC_IDC_SET_LEADCHNL_RA_SENSING_ADAPTATION_MODE: NORMAL
MDC_IDC_SET_LEADCHNL_RA_SENSING_ANODE_ELECTRODE_1: NORMAL
MDC_IDC_SET_LEADCHNL_RA_SENSING_ANODE_LOCATION_1: NORMAL
MDC_IDC_SET_LEADCHNL_RA_SENSING_CATHODE_ELECTRODE_1: NORMAL
MDC_IDC_SET_LEADCHNL_RA_SENSING_CATHODE_LOCATION_1: NORMAL
MDC_IDC_SET_LEADCHNL_RA_SENSING_POLARITY: NORMAL
MDC_IDC_SET_LEADCHNL_RA_SENSING_SENSITIVITY: 0.5 MV
MDC_IDC_SET_LEADCHNL_RV_PACING_AMPLITUDE: 0.88
MDC_IDC_SET_LEADCHNL_RV_PACING_ANODE_ELECTRODE_1: NORMAL
MDC_IDC_SET_LEADCHNL_RV_PACING_ANODE_LOCATION_1: NORMAL
MDC_IDC_SET_LEADCHNL_RV_PACING_CAPTURE_MODE: NORMAL
MDC_IDC_SET_LEADCHNL_RV_PACING_CATHODE_ELECTRODE_1: NORMAL
MDC_IDC_SET_LEADCHNL_RV_PACING_CATHODE_LOCATION_1: NORMAL
MDC_IDC_SET_LEADCHNL_RV_PACING_POLARITY: NORMAL
MDC_IDC_SET_LEADCHNL_RV_PACING_PULSEWIDTH: 0.5 MS
MDC_IDC_SET_LEADCHNL_RV_SENSING_ADAPTATION_MODE: NORMAL
MDC_IDC_SET_LEADCHNL_RV_SENSING_ANODE_ELECTRODE_1: NORMAL
MDC_IDC_SET_LEADCHNL_RV_SENSING_ANODE_LOCATION_1: NORMAL
MDC_IDC_SET_LEADCHNL_RV_SENSING_CATHODE_ELECTRODE_1: NORMAL
MDC_IDC_SET_LEADCHNL_RV_SENSING_CATHODE_LOCATION_1: NORMAL
MDC_IDC_SET_LEADCHNL_RV_SENSING_POLARITY: NORMAL
MDC_IDC_SET_LEADCHNL_RV_SENSING_SENSITIVITY: 2 MV
MDC_IDC_STAT_AT_BURDEN_PERCENT: 0 %
MDC_IDC_STAT_AT_DTM_END: NORMAL
MDC_IDC_STAT_AT_DTM_START: NORMAL
MDC_IDC_STAT_AT_MODE_SW_COUNT: 0
MDC_IDC_STAT_AT_MODE_SW_COUNT_PER_DAY: 0
MDC_IDC_STAT_AT_MODE_SW_PERCENT_TIME: 0 %
MDC_IDC_STAT_BRADY_AP_VP_PERCENT: 2.8 %
MDC_IDC_STAT_BRADY_AP_VS_PERCENT: 1 %
MDC_IDC_STAT_BRADY_AS_VP_PERCENT: 97 %
MDC_IDC_STAT_BRADY_AS_VS_PERCENT: 1 %
MDC_IDC_STAT_BRADY_DTM_END: NORMAL
MDC_IDC_STAT_BRADY_DTM_START: NORMAL
MDC_IDC_STAT_BRADY_RA_PERCENT_PACED: 2.8 %
MDC_IDC_STAT_BRADY_RV_PERCENT_PACED: 99 %
MDC_IDC_STAT_CRT_DTM_END: NORMAL
MDC_IDC_STAT_CRT_DTM_START: NORMAL
MDC_IDC_STAT_HEART_RATE_ATRIAL_MAX: 210 {BEATS}/MIN
MDC_IDC_STAT_HEART_RATE_ATRIAL_MEAN: 67 {BEATS}/MIN
MDC_IDC_STAT_HEART_RATE_ATRIAL_MIN: 50 {BEATS}/MIN
MDC_IDC_STAT_HEART_RATE_DTM_END: NORMAL
MDC_IDC_STAT_HEART_RATE_DTM_START: NORMAL
MDC_IDC_STAT_HEART_RATE_VENTRICULAR_MAX: 190 {BEATS}/MIN
MDC_IDC_STAT_HEART_RATE_VENTRICULAR_MEAN: 67 {BEATS}/MIN
MDC_IDC_STAT_HEART_RATE_VENTRICULAR_MIN: 40 {BEATS}/MIN

## 2019-08-05 ENCOUNTER — ANCILLARY PROCEDURE (OUTPATIENT)
Dept: CARDIOLOGY | Facility: CLINIC | Age: 74
End: 2019-08-05
Attending: INTERNAL MEDICINE
Payer: COMMERCIAL

## 2019-08-05 DIAGNOSIS — Z95.810 ICD (IMPLANTABLE CARDIOVERTER-DEFIBRILLATOR), DUAL, IN SITU: ICD-10-CM

## 2019-08-12 LAB
MDC_IDC_LEAD_IMPLANT_DT: NORMAL
MDC_IDC_LEAD_IMPLANT_DT: NORMAL
MDC_IDC_LEAD_LOCATION: NORMAL
MDC_IDC_LEAD_LOCATION: NORMAL
MDC_IDC_LEAD_LOCATION_DETAIL_1: NORMAL
MDC_IDC_LEAD_LOCATION_DETAIL_1: NORMAL
MDC_IDC_LEAD_MFG: NORMAL
MDC_IDC_LEAD_MFG: NORMAL
MDC_IDC_LEAD_MODEL: NORMAL
MDC_IDC_LEAD_MODEL: NORMAL
MDC_IDC_LEAD_POLARITY_TYPE: NORMAL
MDC_IDC_LEAD_POLARITY_TYPE: NORMAL
MDC_IDC_LEAD_SERIAL: NORMAL
MDC_IDC_LEAD_SERIAL: NORMAL
MDC_IDC_MSMT_BATTERY_DTM: NORMAL
MDC_IDC_MSMT_BATTERY_REMAINING_LONGEVITY: 131 MO
MDC_IDC_MSMT_BATTERY_REMAINING_PERCENTAGE: 95.5 %
MDC_IDC_MSMT_BATTERY_RRT_TRIGGER: NORMAL
MDC_IDC_MSMT_BATTERY_STATUS: NORMAL
MDC_IDC_MSMT_BATTERY_VOLTAGE: 2.96 V
MDC_IDC_MSMT_LEADCHNL_RA_IMPEDANCE_VALUE: 330 OHM
MDC_IDC_MSMT_LEADCHNL_RA_LEAD_CHANNEL_STATUS: NORMAL
MDC_IDC_MSMT_LEADCHNL_RA_PACING_THRESHOLD_AMPLITUDE: 0.62 V
MDC_IDC_MSMT_LEADCHNL_RA_PACING_THRESHOLD_PULSEWIDTH: 0.5 MS
MDC_IDC_MSMT_LEADCHNL_RA_SENSING_INTR_AMPL: 1.5 MV
MDC_IDC_MSMT_LEADCHNL_RV_IMPEDANCE_VALUE: 360 OHM
MDC_IDC_MSMT_LEADCHNL_RV_LEAD_CHANNEL_STATUS: NORMAL
MDC_IDC_MSMT_LEADCHNL_RV_PACING_THRESHOLD_AMPLITUDE: 0.62 V
MDC_IDC_MSMT_LEADCHNL_RV_PACING_THRESHOLD_PULSEWIDTH: 0.5 MS
MDC_IDC_MSMT_LEADCHNL_RV_SENSING_INTR_AMPL: 7.6 MV
MDC_IDC_PG_IMPLANT_DTM: NORMAL
MDC_IDC_PG_MFG: NORMAL
MDC_IDC_PG_MODEL: NORMAL
MDC_IDC_PG_SERIAL: NORMAL
MDC_IDC_PG_TYPE: NORMAL
MDC_IDC_SESS_CLINIC_NAME: NORMAL
MDC_IDC_SESS_DTM: NORMAL
MDC_IDC_SESS_REPROGRAMMED: NO
MDC_IDC_SESS_TYPE: NORMAL
MDC_IDC_SET_BRADY_AT_MODE_SWITCH_MODE: NORMAL
MDC_IDC_SET_BRADY_AT_MODE_SWITCH_RATE: 180 {BEATS}/MIN
MDC_IDC_SET_BRADY_LOWRATE: 55 {BEATS}/MIN
MDC_IDC_SET_BRADY_MAX_SENSOR_RATE: 140 {BEATS}/MIN
MDC_IDC_SET_BRADY_MAX_TRACKING_RATE: 140 {BEATS}/MIN
MDC_IDC_SET_BRADY_MODE: NORMAL
MDC_IDC_SET_BRADY_PAV_DELAY_LOW: 200 MS
MDC_IDC_SET_BRADY_SAV_DELAY_LOW: 170 MS
MDC_IDC_SET_LEADCHNL_RA_PACING_AMPLITUDE: 1.62
MDC_IDC_SET_LEADCHNL_RA_PACING_ANODE_ELECTRODE_1: NORMAL
MDC_IDC_SET_LEADCHNL_RA_PACING_ANODE_LOCATION_1: NORMAL
MDC_IDC_SET_LEADCHNL_RA_PACING_CAPTURE_MODE: NORMAL
MDC_IDC_SET_LEADCHNL_RA_PACING_CATHODE_ELECTRODE_1: NORMAL
MDC_IDC_SET_LEADCHNL_RA_PACING_CATHODE_LOCATION_1: NORMAL
MDC_IDC_SET_LEADCHNL_RA_PACING_POLARITY: NORMAL
MDC_IDC_SET_LEADCHNL_RA_PACING_PULSEWIDTH: 0.5 MS
MDC_IDC_SET_LEADCHNL_RA_SENSING_ADAPTATION_MODE: NORMAL
MDC_IDC_SET_LEADCHNL_RA_SENSING_ANODE_ELECTRODE_1: NORMAL
MDC_IDC_SET_LEADCHNL_RA_SENSING_ANODE_LOCATION_1: NORMAL
MDC_IDC_SET_LEADCHNL_RA_SENSING_CATHODE_ELECTRODE_1: NORMAL
MDC_IDC_SET_LEADCHNL_RA_SENSING_CATHODE_LOCATION_1: NORMAL
MDC_IDC_SET_LEADCHNL_RA_SENSING_POLARITY: NORMAL
MDC_IDC_SET_LEADCHNL_RA_SENSING_SENSITIVITY: 0.5 MV
MDC_IDC_SET_LEADCHNL_RV_PACING_AMPLITUDE: 0.88
MDC_IDC_SET_LEADCHNL_RV_PACING_ANODE_ELECTRODE_1: NORMAL
MDC_IDC_SET_LEADCHNL_RV_PACING_ANODE_LOCATION_1: NORMAL
MDC_IDC_SET_LEADCHNL_RV_PACING_CAPTURE_MODE: NORMAL
MDC_IDC_SET_LEADCHNL_RV_PACING_CATHODE_ELECTRODE_1: NORMAL
MDC_IDC_SET_LEADCHNL_RV_PACING_CATHODE_LOCATION_1: NORMAL
MDC_IDC_SET_LEADCHNL_RV_PACING_POLARITY: NORMAL
MDC_IDC_SET_LEADCHNL_RV_PACING_PULSEWIDTH: 0.5 MS
MDC_IDC_SET_LEADCHNL_RV_SENSING_ADAPTATION_MODE: NORMAL
MDC_IDC_SET_LEADCHNL_RV_SENSING_ANODE_ELECTRODE_1: NORMAL
MDC_IDC_SET_LEADCHNL_RV_SENSING_ANODE_LOCATION_1: NORMAL
MDC_IDC_SET_LEADCHNL_RV_SENSING_CATHODE_ELECTRODE_1: NORMAL
MDC_IDC_SET_LEADCHNL_RV_SENSING_CATHODE_LOCATION_1: NORMAL
MDC_IDC_SET_LEADCHNL_RV_SENSING_POLARITY: NORMAL
MDC_IDC_SET_LEADCHNL_RV_SENSING_SENSITIVITY: 2 MV
MDC_IDC_STAT_AT_BURDEN_PERCENT: 0 %
MDC_IDC_STAT_AT_DTM_END: NORMAL
MDC_IDC_STAT_AT_DTM_START: NORMAL
MDC_IDC_STAT_AT_MODE_SW_COUNT: 0
MDC_IDC_STAT_AT_MODE_SW_COUNT_PER_DAY: 0
MDC_IDC_STAT_AT_MODE_SW_PERCENT_TIME: 0 %
MDC_IDC_STAT_BRADY_AP_VP_PERCENT: 1.6 %
MDC_IDC_STAT_BRADY_AP_VS_PERCENT: 1 %
MDC_IDC_STAT_BRADY_AS_VP_PERCENT: 98 %
MDC_IDC_STAT_BRADY_AS_VS_PERCENT: 1 %
MDC_IDC_STAT_BRADY_DTM_END: NORMAL
MDC_IDC_STAT_BRADY_DTM_START: NORMAL
MDC_IDC_STAT_BRADY_RA_PERCENT_PACED: 1.6 %
MDC_IDC_STAT_BRADY_RV_PERCENT_PACED: 99 %
MDC_IDC_STAT_CRT_DTM_END: NORMAL
MDC_IDC_STAT_CRT_DTM_START: NORMAL
MDC_IDC_STAT_HEART_RATE_ATRIAL_MAX: 260 {BEATS}/MIN
MDC_IDC_STAT_HEART_RATE_ATRIAL_MEAN: 72 {BEATS}/MIN
MDC_IDC_STAT_HEART_RATE_ATRIAL_MIN: 50 {BEATS}/MIN
MDC_IDC_STAT_HEART_RATE_DTM_END: NORMAL
MDC_IDC_STAT_HEART_RATE_DTM_START: NORMAL
MDC_IDC_STAT_HEART_RATE_VENTRICULAR_MAX: 210 {BEATS}/MIN
MDC_IDC_STAT_HEART_RATE_VENTRICULAR_MEAN: 73 {BEATS}/MIN
MDC_IDC_STAT_HEART_RATE_VENTRICULAR_MIN: 40 {BEATS}/MIN

## 2019-08-15 ENCOUNTER — ANCILLARY PROCEDURE (OUTPATIENT)
Dept: CARDIOLOGY | Facility: CLINIC | Age: 74
End: 2019-08-15
Attending: INTERNAL MEDICINE
Payer: COMMERCIAL

## 2019-08-15 DIAGNOSIS — Z95.0 CARDIAC PACEMAKER IN SITU: ICD-10-CM

## 2019-08-15 PROCEDURE — 93294 REM INTERROG EVL PM/LDLS PM: CPT | Performed by: INTERNAL MEDICINE

## 2019-08-15 PROCEDURE — 93296 REM INTERROG EVL PM/IDS: CPT | Performed by: INTERNAL MEDICINE

## 2019-08-24 LAB
MDC_IDC_LEAD_IMPLANT_DT: NORMAL
MDC_IDC_LEAD_IMPLANT_DT: NORMAL
MDC_IDC_LEAD_LOCATION: NORMAL
MDC_IDC_LEAD_LOCATION: NORMAL
MDC_IDC_LEAD_LOCATION_DETAIL_1: NORMAL
MDC_IDC_LEAD_LOCATION_DETAIL_1: NORMAL
MDC_IDC_LEAD_MFG: NORMAL
MDC_IDC_LEAD_MFG: NORMAL
MDC_IDC_LEAD_MODEL: NORMAL
MDC_IDC_LEAD_MODEL: NORMAL
MDC_IDC_LEAD_POLARITY_TYPE: NORMAL
MDC_IDC_LEAD_POLARITY_TYPE: NORMAL
MDC_IDC_LEAD_SERIAL: NORMAL
MDC_IDC_LEAD_SERIAL: NORMAL
MDC_IDC_MSMT_BATTERY_DTM: NORMAL
MDC_IDC_MSMT_BATTERY_REMAINING_LONGEVITY: 137 MO
MDC_IDC_MSMT_BATTERY_REMAINING_PERCENTAGE: 95.5 %
MDC_IDC_MSMT_BATTERY_RRT_TRIGGER: NORMAL
MDC_IDC_MSMT_BATTERY_STATUS: NORMAL
MDC_IDC_MSMT_BATTERY_VOLTAGE: 2.96 V
MDC_IDC_MSMT_LEADCHNL_RA_IMPEDANCE_VALUE: 350 OHM
MDC_IDC_MSMT_LEADCHNL_RA_LEAD_CHANNEL_STATUS: NORMAL
MDC_IDC_MSMT_LEADCHNL_RA_PACING_THRESHOLD_AMPLITUDE: 0.62 V
MDC_IDC_MSMT_LEADCHNL_RA_PACING_THRESHOLD_PULSEWIDTH: 0.5 MS
MDC_IDC_MSMT_LEADCHNL_RA_SENSING_INTR_AMPL: 1.4 MV
MDC_IDC_MSMT_LEADCHNL_RV_IMPEDANCE_VALUE: 460 OHM
MDC_IDC_MSMT_LEADCHNL_RV_LEAD_CHANNEL_STATUS: NORMAL
MDC_IDC_MSMT_LEADCHNL_RV_PACING_THRESHOLD_AMPLITUDE: 0.62 V
MDC_IDC_MSMT_LEADCHNL_RV_PACING_THRESHOLD_PULSEWIDTH: 0.5 MS
MDC_IDC_MSMT_LEADCHNL_RV_SENSING_INTR_AMPL: 6.1 MV
MDC_IDC_PG_IMPLANT_DTM: NORMAL
MDC_IDC_PG_MFG: NORMAL
MDC_IDC_PG_MODEL: NORMAL
MDC_IDC_PG_SERIAL: NORMAL
MDC_IDC_PG_TYPE: NORMAL
MDC_IDC_SESS_CLINIC_NAME: NORMAL
MDC_IDC_SESS_DTM: NORMAL
MDC_IDC_SESS_REPROGRAMMED: NO
MDC_IDC_SESS_TYPE: NORMAL
MDC_IDC_SET_BRADY_AT_MODE_SWITCH_MODE: NORMAL
MDC_IDC_SET_BRADY_AT_MODE_SWITCH_RATE: 180 {BEATS}/MIN
MDC_IDC_SET_BRADY_LOWRATE: 55 {BEATS}/MIN
MDC_IDC_SET_BRADY_MAX_SENSOR_RATE: 140 {BEATS}/MIN
MDC_IDC_SET_BRADY_MAX_TRACKING_RATE: 140 {BEATS}/MIN
MDC_IDC_SET_BRADY_MODE: NORMAL
MDC_IDC_SET_BRADY_PAV_DELAY_LOW: 200 MS
MDC_IDC_SET_BRADY_SAV_DELAY_LOW: 170 MS
MDC_IDC_SET_LEADCHNL_RA_PACING_AMPLITUDE: 1.62
MDC_IDC_SET_LEADCHNL_RA_PACING_ANODE_ELECTRODE_1: NORMAL
MDC_IDC_SET_LEADCHNL_RA_PACING_ANODE_LOCATION_1: NORMAL
MDC_IDC_SET_LEADCHNL_RA_PACING_CAPTURE_MODE: NORMAL
MDC_IDC_SET_LEADCHNL_RA_PACING_CATHODE_ELECTRODE_1: NORMAL
MDC_IDC_SET_LEADCHNL_RA_PACING_CATHODE_LOCATION_1: NORMAL
MDC_IDC_SET_LEADCHNL_RA_PACING_POLARITY: NORMAL
MDC_IDC_SET_LEADCHNL_RA_PACING_PULSEWIDTH: 0.5 MS
MDC_IDC_SET_LEADCHNL_RA_SENSING_ADAPTATION_MODE: NORMAL
MDC_IDC_SET_LEADCHNL_RA_SENSING_ANODE_ELECTRODE_1: NORMAL
MDC_IDC_SET_LEADCHNL_RA_SENSING_ANODE_LOCATION_1: NORMAL
MDC_IDC_SET_LEADCHNL_RA_SENSING_CATHODE_ELECTRODE_1: NORMAL
MDC_IDC_SET_LEADCHNL_RA_SENSING_CATHODE_LOCATION_1: NORMAL
MDC_IDC_SET_LEADCHNL_RA_SENSING_POLARITY: NORMAL
MDC_IDC_SET_LEADCHNL_RA_SENSING_SENSITIVITY: 0.5 MV
MDC_IDC_SET_LEADCHNL_RV_PACING_AMPLITUDE: 0.88
MDC_IDC_SET_LEADCHNL_RV_PACING_ANODE_ELECTRODE_1: NORMAL
MDC_IDC_SET_LEADCHNL_RV_PACING_ANODE_LOCATION_1: NORMAL
MDC_IDC_SET_LEADCHNL_RV_PACING_CAPTURE_MODE: NORMAL
MDC_IDC_SET_LEADCHNL_RV_PACING_CATHODE_ELECTRODE_1: NORMAL
MDC_IDC_SET_LEADCHNL_RV_PACING_CATHODE_LOCATION_1: NORMAL
MDC_IDC_SET_LEADCHNL_RV_PACING_POLARITY: NORMAL
MDC_IDC_SET_LEADCHNL_RV_PACING_PULSEWIDTH: 0.5 MS
MDC_IDC_SET_LEADCHNL_RV_SENSING_ADAPTATION_MODE: NORMAL
MDC_IDC_SET_LEADCHNL_RV_SENSING_ANODE_ELECTRODE_1: NORMAL
MDC_IDC_SET_LEADCHNL_RV_SENSING_ANODE_LOCATION_1: NORMAL
MDC_IDC_SET_LEADCHNL_RV_SENSING_CATHODE_ELECTRODE_1: NORMAL
MDC_IDC_SET_LEADCHNL_RV_SENSING_CATHODE_LOCATION_1: NORMAL
MDC_IDC_SET_LEADCHNL_RV_SENSING_POLARITY: NORMAL
MDC_IDC_SET_LEADCHNL_RV_SENSING_SENSITIVITY: 2 MV
MDC_IDC_STAT_AT_BURDEN_PERCENT: 0 %
MDC_IDC_STAT_AT_DTM_END: NORMAL
MDC_IDC_STAT_AT_DTM_START: NORMAL
MDC_IDC_STAT_AT_MODE_SW_COUNT: 0
MDC_IDC_STAT_AT_MODE_SW_COUNT_PER_DAY: 0
MDC_IDC_STAT_AT_MODE_SW_PERCENT_TIME: 0 %
MDC_IDC_STAT_BRADY_AP_VP_PERCENT: 1.3 %
MDC_IDC_STAT_BRADY_AP_VS_PERCENT: 1 %
MDC_IDC_STAT_BRADY_AS_VP_PERCENT: 99 %
MDC_IDC_STAT_BRADY_AS_VS_PERCENT: 1 %
MDC_IDC_STAT_BRADY_DTM_END: NORMAL
MDC_IDC_STAT_BRADY_DTM_START: NORMAL
MDC_IDC_STAT_BRADY_RA_PERCENT_PACED: 1.2 %
MDC_IDC_STAT_BRADY_RV_PERCENT_PACED: 99 %
MDC_IDC_STAT_CRT_DTM_END: NORMAL
MDC_IDC_STAT_CRT_DTM_START: NORMAL
MDC_IDC_STAT_HEART_RATE_ATRIAL_MAX: 220 {BEATS}/MIN
MDC_IDC_STAT_HEART_RATE_ATRIAL_MEAN: 69 {BEATS}/MIN
MDC_IDC_STAT_HEART_RATE_ATRIAL_MIN: 50 {BEATS}/MIN
MDC_IDC_STAT_HEART_RATE_DTM_END: NORMAL
MDC_IDC_STAT_HEART_RATE_DTM_START: NORMAL
MDC_IDC_STAT_HEART_RATE_VENTRICULAR_MAX: 200 {BEATS}/MIN
MDC_IDC_STAT_HEART_RATE_VENTRICULAR_MEAN: 69 {BEATS}/MIN
MDC_IDC_STAT_HEART_RATE_VENTRICULAR_MIN: 40 {BEATS}/MIN

## 2019-12-02 ENCOUNTER — DOCUMENTATION ONLY (OUTPATIENT)
Dept: CARDIOLOGY | Facility: CLINIC | Age: 74
End: 2019-12-02

## 2019-12-02 NOTE — TELEPHONE ENCOUNTER
Patient missed [facility] transmission on 11/21/19. Sent letter 11/22, no response. Sent 1 week letter today